# Patient Record
Sex: FEMALE | Race: BLACK OR AFRICAN AMERICAN | Employment: FULL TIME | URBAN - METROPOLITAN AREA
[De-identification: names, ages, dates, MRNs, and addresses within clinical notes are randomized per-mention and may not be internally consistent; named-entity substitution may affect disease eponyms.]

---

## 2018-12-27 ENCOUNTER — HOSPITAL ENCOUNTER (EMERGENCY)
Age: 28
Discharge: HOME | End: 2018-12-27
Attending: EMERGENCY MEDICINE
Payer: MEDICAID

## 2018-12-27 VITALS
HEART RATE: 68 BPM | BODY MASS INDEX: 25.07 KG/M2 | SYSTOLIC BLOOD PRESSURE: 121 MMHG | HEIGHT: 66 IN | DIASTOLIC BLOOD PRESSURE: 63 MMHG | TEMPERATURE: 97.4 F | WEIGHT: 156 LBS | OXYGEN SATURATION: 100 % | RESPIRATION RATE: 20 BRPM

## 2018-12-27 DIAGNOSIS — R10.2 ACUTE PELVIC PAIN, FEMALE: ICD-10-CM

## 2018-12-27 DIAGNOSIS — M54.6 ACUTE BILATERAL THORACIC BACK PAIN: Primary | ICD-10-CM

## 2018-12-27 LAB
APPEARANCE UR: CLEAR
BACTERIA URNS QL MICRO: ABNORMAL /HPF
BILIRUB UR QL: NEGATIVE
COLOR UR: YELLOW
EPITH CASTS URNS QL MICRO: ABNORMAL /LPF (ref 0–5)
GLUCOSE UR STRIP.AUTO-MCNC: NEGATIVE MG/DL
HCG UR QL: NEGATIVE
HGB UR QL STRIP: ABNORMAL
KETONES UR QL STRIP.AUTO: NEGATIVE MG/DL
LEUKOCYTE ESTERASE UR QL STRIP.AUTO: NEGATIVE
NITRITE UR QL STRIP.AUTO: NEGATIVE
PH UR STRIP: 5 [PH] (ref 5–8)
PROT UR STRIP-MCNC: NEGATIVE MG/DL
RBC #/AREA URNS HPF: ABNORMAL /HPF (ref 0–5)
SERVICE CMNT-IMP: NORMAL
SP GR UR REFRACTOMETRY: 1.02 (ref 1–1.03)
UROBILINOGEN UR QL STRIP.AUTO: 0.2 EU/DL (ref 0.2–1)
WBC URNS QL MICRO: ABNORMAL /HPF (ref 0–5)
WET PREP GENITAL: NORMAL

## 2018-12-27 PROCEDURE — 87491 CHLMYD TRACH DNA AMP PROBE: CPT

## 2018-12-27 PROCEDURE — 81025 URINE PREGNANCY TEST: CPT

## 2018-12-27 PROCEDURE — 87210 SMEAR WET MOUNT SALINE/INK: CPT

## 2018-12-27 PROCEDURE — 87086 URINE CULTURE/COLONY COUNT: CPT

## 2018-12-27 PROCEDURE — 99283 EMERGENCY DEPT VISIT LOW MDM: CPT

## 2018-12-27 PROCEDURE — 81001 URINALYSIS AUTO W/SCOPE: CPT

## 2018-12-27 RX ORDER — CHLORZOXAZONE 500 MG/1
500 TABLET ORAL
Qty: 21 TAB | Refills: 0 | Status: SHIPPED | OUTPATIENT
Start: 2018-12-27 | End: 2019-01-12

## 2018-12-27 RX ORDER — IBUPROFEN 600 MG/1
600 TABLET ORAL
Qty: 20 TAB | Refills: 0 | Status: SHIPPED | OUTPATIENT
Start: 2018-12-27 | End: 2019-01-12

## 2018-12-27 NOTE — ED TRIAGE NOTES
Patient with complaints of mid back pain and states that she is having urinary frequency and states she had an  in October and hasn't had a period since.

## 2018-12-27 NOTE — DISCHARGE INSTRUCTIONS
Pelvic Pain: Care Instructions  Your Care Instructions    Pelvic pain, or pain in the lower belly, can have many causes. Often pelvic pain is not serious and gets better in a few days. If your pain continues or gets worse, you may need tests and treatment. Tell your doctor about any new symptoms. These may be signs of a serious problem. Follow-up care is a key part of your treatment and safety. Be sure to make and go to all appointments, and call your doctor if you are having problems. It's also a good idea to know your test results and keep a list of the medicines you take. How can you care for yourself at home? · Rest until you feel better. Lie down, and raise your legs by placing a pillow under your knees. · Drink plenty of fluids. You may find that small, frequent sips are easier on your stomach than if you drink a lot at once. Avoid drinks with carbonation or caffeine, such as soda pop, tea, or coffee. · Try eating several small meals instead of 2 or 3 large ones. Eat mild foods, such as rice, dry toast or crackers, bananas, and applesauce. Avoid fatty and spicy foods, other fruits, and alcohol until 48 hours after your symptoms have gone away. · Take an over-the-counter pain medicine, such as acetaminophen (Tylenol), ibuprofen (Advil, Motrin), or naproxen (Aleve). Read and follow all instructions on the label. · Do not take two or more pain medicines at the same time unless the doctor told you to. Many pain medicines have acetaminophen, which is Tylenol. Too much acetaminophen (Tylenol) can be harmful. · You can put a heating pad, a warm cloth, or moist heat on your belly to relieve pain. When should you call for help?   Call your doctor now or seek immediate medical care if:    · You have a new or higher fever.     · You have unusual vaginal bleeding.     · You have new or worse belly or pelvic pain.     · You have vaginal discharge that has increased in amount or smells bad.    Watch closely for changes in your health, and be sure to contact your doctor if:    · You do not get better as expected. Where can you learn more? Go to http://artemio-mely.info/. Enter [phone] in the search box to learn more about \"Pelvic Pain: Care Instructions. \"  Current as of: May 15, 2018  Content Version: 11.8  © 6669-0799 [employer]. Care instructions adapted under license by [employer] (which disclaims liability or warranty for this information). If you have questions about a medical condition or this instruction, always ask your healthcare professional. [place] [address] any warranty or liability for your use of this information. Back Pain: Care Instructions  Your Care Instructions    Back pain has many possible causes. It is often related to problems with muscles and ligaments of the back. It may also be related to problems with the nerves, discs, or bones of the back. Moving, lifting, standing, sitting, or sleeping in an awkward way can strain the back. Sometimes you don't notice the injury until later. Arthritis is another common cause of back pain. Although it may hurt a lot, back pain usually improves on its own within several weeks. Most people recover in 12 weeks or less. Using good home treatment and being careful not to stress your back can help you feel better sooner. Follow-up care is a key part of your treatment and safety. Be sure to make and go to all appointments, and call your doctor if you are having problems. It's also a good idea to know your test results and keep a list of the medicines you take. How can you care for yourself at home? · Sit or lie in positions that are most comfortable and reduce your pain. Try one of these positions when you lie down:  ? Lie on your back with your knees bent and supported by large pillows. ? Lie on the floor with your legs on the seat of a sofa or chair.   ? Lie on your side with your knees and hips bent and a pillow between your legs. ? Lie on your stomach if it does not make pain worse. · Do not sit up in bed, and avoid soft couches and twisted positions. Bed rest can help relieve pain at first, but it delays healing. Avoid bed rest after the first day of back pain. · Change positions every 30 minutes. If you must sit for long periods of time, take breaks from sitting. Get up and walk around, or lie in a comfortable position. · Try using a heating pad on a low or medium setting for 15 to 20 minutes every 2 or 3 hours. Try a warm shower in place of one session with the heating pad. · You can also try an ice pack for 10 to 15 minutes every 2 to 3 hours. Put a thin cloth between the ice pack and your skin. · Take pain medicines exactly as directed. ? If the doctor gave you a prescription medicine for pain, take it as prescribed. ? If you are not taking a prescription pain medicine, ask your doctor if you can take an over-the-counter medicine. · Take short walks several times a day. You can start with 5 to 10 minutes, 3 or 4 times a day, and work up to longer walks. Walk on level surfaces and avoid hills and stairs until your back is better. · Return to work and other activities as soon as you can. Continued rest without activity is usually not good for your back. · To prevent future back pain, do exercises to stretch and strengthen your back and stomach. Learn how to use good posture, safe lifting techniques, and proper body mechanics. When should you call for help? Call your doctor now or seek immediate medical care if:    · You have new or worsening numbness in your legs.     · You have new or worsening weakness in your legs. (This could make it hard to stand up.)     · You lose control of your bladder or bowels.    Watch closely for changes in your health, and be sure to contact your doctor if:    · You have a fever, lose weight, or don't feel well.     · You do not get better as expected. Where can you learn more? Go to http://artemio-mely.info/. Enter R513 in the search box to learn more about \"Back Pain: Care Instructions. \"  Current as of: November 29, 2017  Content Version: 11.8  © 8079-6279 [employer]. Care instructions adapted under license by [employer] (which disclaims liability or warranty for this information). If you have questions about a medical condition or this instruction, always ask your healthcare professional. [place] [address] any warranty or liability for your use of this information.

## 2018-12-27 NOTE — LETTER
[facility] [place] 
[facility] EMERGENCY DEPT 
[place] [id number] 
[phone] Work/School Note Date: 12/27/2018 To Whom It May concern: 
 
Ro Chin was seen and treated today in the emergency room by the following provider(s): 
Attending Provider: Viry Farah MD 
Physician Assistant: Mandy Altamirano PA-C. Ro Chin may return to work on 12/29/2018. Sincerely, Caterina Mathew PA-C

## 2018-12-27 NOTE — ED PROVIDER NOTES
EMERGENCY DEPARTMENT HISTORY AND PHYSICAL EXAM    Date: 2018  Patient Name: Basil Messer    History of Presenting Illness     Chief Complaint   Patient presents with    Back Pain         History Provided By: Patient    Chief Complaint: Back Pain  Duration: Few Weeks  Timing:  Acute  Location: Lower  Quality: Aching  Severity: 7 out of 10  Modifying Factors: No modifying factors  Associated Symptoms: abdominal pain, urinary frequency    Additional History (Context):   9:12 AM  Basil Messer is a 33 y.o. female with PMHX of  who presents to the emergency department C/O lower back pain onset few weeks ago. Associated sxs include abdominal pain, urinary frequency. L flank >> R flank. Pt endorses intermittent generalized pelvic pain. Pt states that the pain was worse this morning. Pt states that movement makes the pain worse. Pt states that she had an elective  two months ago. Pt states that there is a chance of pregnancy. Pt denies vaginal discharge, vaginal bleeding, dysuria, fever, chills, hx of UTI, chance of STDs, and any other sxs or complaints. PCP: None        Past History     Past Medical History:  History reviewed. No pertinent past medical history.     Past Surgical History:  Past Surgical History:   Procedure Laterality Date    HX LEFT SALPINGO-OOPHORECTOMY         Family History:  Family History   Problem Relation Age of Onset    Heart Disease Maternal Grandmother         had a triple bypass 2013    Alcohol abuse Maternal Grandfather     Bleeding Prob Other         Breast Cancer       Social History:  Social History     Tobacco Use    Smoking status: Current Every Day Smoker     Types: Cigars    Smokeless tobacco: Never Used    Tobacco comment: pt smokes 3/day (black and mild cigars)   Substance Use Topics    Alcohol use: No     Comment: socially    Drug use: Yes     Frequency: 2.0 times per week     Types: Marijuana       Allergies:  No Known Allergies      Review of Systems   Review of Systems   Constitutional: Negative for chills and fever. Respiratory: Negative for cough and shortness of breath. Cardiovascular: Negative for chest pain. Gastrointestinal: Positive for abdominal pain. Negative for nausea and vomiting. Genitourinary: Positive for frequency. Negative for dysuria, flank pain, hematuria, urgency, vaginal bleeding and vaginal discharge. Musculoskeletal: Positive for back pain (lower). All other systems reviewed and are negative. Physical Exam     Vitals:    12/27/18 0858   BP: 121/63   Pulse: 68   Resp: 20   Temp: 97.4 °F (36.3 °C)   SpO2: 100%   Weight: 70.8 kg (156 lb)   Height: 5' 6\" (1.676 m)     Physical Exam   Constitutional: She is oriented to person, place, and time. She appears well-developed and well-nourished. No distress. AA female in NAD. Alert. Uncomfortable when sitting up   HENT:   Head: Normocephalic and atraumatic. Right Ear: External ear normal.   Left Ear: External ear normal.   Eyes: Conjunctivae are normal.   Neck: Normal range of motion. Cardiovascular: Normal rate, regular rhythm, normal heart sounds and intact distal pulses. Exam reveals no gallop and no friction rub. No murmur heard. Pulmonary/Chest: Effort normal and breath sounds normal. No accessory muscle usage. No tachypnea. No respiratory distress. She has no decreased breath sounds. She has no wheezes. She has no rhonchi. She has no rales. Abdominal: Soft. Normal appearance. She exhibits no distension, no ascites and no mass. There is no rigidity, no rebound, no guarding, no CVA tenderness and no tenderness at McBurney's point. Genitourinary:   Genitourinary Comments: Female chaperone in room. See pelvic procedure note. Verbal consent obtained prior to procedure. Musculoskeletal: Normal range of motion. Neurological: She is alert and oriented to person, place, and time. Skin: Skin is warm and dry. No rash noted. She is not diaphoretic.  No erythema. Psychiatric: She has a normal mood and affect. Judgment normal.   Nursing note and vitals reviewed. Diagnostic Study Results     Labs -     No results found for this or any previous visit (from the past 12 hour(s)). Radiologic Studies -   No orders to display     CT Results  (Last 48 hours)    None        CXR Results  (Last 48 hours)    None          Medications given in the ED-  Medications - No data to display      Medical Decision Making   I am the first provider for this patient. I reviewed the vital signs, available nursing notes, past medical history, past surgical history, family history and social history. Vital Signs-Reviewed the patient's vital signs. Pulse Oximetry Analysis - 100% on RA     Records Reviewed: Nursing Notes and Old Medical Records    Provider Notes (Medical Decision Making): UTI/pyelo, stone, renal infarct, MSK, pregnancy (ectopic), STI/PID. Doubt appy or dissection. Procedures:  Pelvic Exam  Date/Time: 12/27/2018 9:18 AM  Performed by: PA  Procedure duration:  15 minutes. Documented by: Erica Tom PA-C. Exam assisted by:  Female chaperone in room. Type of exam performed: bimanual and speculum. External genitalia appearance: normal.    Vaginal exam:  normal.    Cervical exam:  normal.    Specimen(s) collected:  chlamydia, GC and vaginal culture. Bimanual exam:  normal.    Patient tolerance: Patient tolerated the procedure well with no immediate complications            ED Course:   9:12 AM Initial assessment performed. The patients presenting problems have been discussed, and they are in agreement with the care plan formulated and outlined with them. I have encouraged them to ask questions as they arise throughout their visit. Diagnosis and Disposition     Bilateral flank pain x 2 months. Benign abdomen. No pain at McBurney's. Neg Boggs's. Labs reassuring. Pelvic unremarkable. Suspect MSK as pain worse with movement.  No indication for emergent advanced imaging. Reasons to RTED discussed with pt. All questions answered. Pt feels comfortable going home at this time. Pt expressed understanding and she agrees with plan. DISCHARGE NOTE:  10:42 AM  Kaley Russ's  results have been reviewed with her. She has been counseled regarding her diagnosis, treatment, and plan. She verbally conveys understanding and agreement of the signs, symptoms, diagnosis, treatment and prognosis and additionally agrees to follow up as discussed. She also agrees with the care-plan and conveys that all of her questions have been answered. I have also provided discharge instructions for her that include: educational information regarding their diagnosis and treatment, and list of reasons why they would want to return to the ED prior to their follow-up appointment, should her condition change. She has been provided with education for proper emergency department utilization. CLINICAL IMPRESSION:    1. Acute bilateral thoracic back pain    2. Acute pelvic pain, female        PLAN:  1. D/C Home  2. Discharge Medication List as of 12/27/2018 10:42 AM      START taking these medications    Details   chlorzoxazone (PARAFON FORTE DSC) 500 mg tablet Take 1 Tab by mouth three (3) times daily as needed for Muscle Spasm(s). , Print, Disp-21 Tab, R-0      ibuprofen (MOTRIN) 600 mg tablet Take 1 Tab by mouth every six (6) hours as needed for Pain. Take with food. , Print, Disp-20 Tab, R-0           3.    Follow-up Information     Follow up With Specialties Details Why Contact Info    Lexi Merino, DO Orthopedic Surgery Schedule an appointment as soon as possible for a visit For follow up with orthopedic surgeon [address] [place] [address] and Aidan SANCHEZ[address]      [facility] EMERGENCY DEPT Emergency Medicine Go to As needed, if symptoms worsen [address] [id number] [phone] _______________________________    Attestations: This note is prepared by RACHEL ORTA, acting as Scribe for MonkimunFREDERICK. Monkimun, FREDERICK:  The scribe's documentation has been prepared under my direction and personally reviewed by me in its entirety.   I confirm that the note above accurately reflects all work, treatment, procedures, and medical decision making performed by me.  _______________________________

## 2018-12-28 LAB
BACTERIA SPEC CULT: NORMAL
C TRACH RRNA SPEC QL NAA+PROBE: NEGATIVE
N GONORRHOEA RRNA SPEC QL NAA+PROBE: NEGATIVE
SERVICE CMNT-IMP: NORMAL
SPECIMEN SOURCE: NORMAL

## 2019-01-12 ENCOUNTER — HOSPITAL ENCOUNTER (EMERGENCY)
Age: 29
Discharge: HOME | End: 2019-01-12
Attending: EMERGENCY MEDICINE
Payer: MEDICAID

## 2019-01-12 VITALS
BODY MASS INDEX: 25.07 KG/M2 | OXYGEN SATURATION: 100 % | RESPIRATION RATE: 16 BRPM | TEMPERATURE: 97 F | SYSTOLIC BLOOD PRESSURE: 121 MMHG | DIASTOLIC BLOOD PRESSURE: 72 MMHG | WEIGHT: 156 LBS | HEART RATE: 78 BPM | HEIGHT: 66 IN

## 2019-01-12 DIAGNOSIS — Z71.6 TOBACCO ABUSE COUNSELING: ICD-10-CM

## 2019-01-12 DIAGNOSIS — Z72.0 TOBACCO USE: ICD-10-CM

## 2019-01-12 DIAGNOSIS — K29.00 ACUTE GASTRITIS WITHOUT HEMORRHAGE, UNSPECIFIED GASTRITIS TYPE: Primary | ICD-10-CM

## 2019-01-12 DIAGNOSIS — G89.29 CHRONIC LOW BACK PAIN WITHOUT SCIATICA, UNSPECIFIED BACK PAIN LATERALITY: ICD-10-CM

## 2019-01-12 DIAGNOSIS — M54.50 CHRONIC LOW BACK PAIN WITHOUT SCIATICA, UNSPECIFIED BACK PAIN LATERALITY: ICD-10-CM

## 2019-01-12 LAB
ALBUMIN SERPL-MCNC: 4 G/DL (ref 3.4–5)
ALBUMIN/GLOB SERPL: 1.3 {RATIO} (ref 0.8–1.7)
ALP SERPL-CCNC: 47 U/L (ref 45–117)
ALT SERPL-CCNC: 38 U/L (ref 13–56)
ANION GAP SERPL CALC-SCNC: 8 MMOL/L (ref 3–18)
APPEARANCE UR: CLEAR
AST SERPL-CCNC: 33 U/L (ref 15–37)
BACTERIA URNS QL MICRO: ABNORMAL /HPF
BASOPHILS # BLD: 0.1 K/UL (ref 0–0.1)
BASOPHILS NFR BLD: 1 % (ref 0–2)
BILIRUB SERPL-MCNC: 0.5 MG/DL (ref 0.2–1)
BILIRUB UR QL: NEGATIVE
BUN SERPL-MCNC: 11 MG/DL (ref 7–18)
BUN/CREAT SERPL: 13 (ref 12–20)
CALCIUM SERPL-MCNC: 9 MG/DL (ref 8.5–10.1)
CHLORIDE SERPL-SCNC: 111 MMOL/L (ref 100–108)
CO2 SERPL-SCNC: 25 MMOL/L (ref 21–32)
COLOR UR: YELLOW
CREAT SERPL-MCNC: 0.84 MG/DL (ref 0.6–1.3)
DIFFERENTIAL METHOD BLD: NORMAL
EOSINOPHIL # BLD: 0.1 K/UL (ref 0–0.4)
EOSINOPHIL NFR BLD: 2 % (ref 0–5)
EPITH CASTS URNS QL MICRO: ABNORMAL /LPF (ref 0–5)
ERYTHROCYTE [DISTWIDTH] IN BLOOD BY AUTOMATED COUNT: 12.3 % (ref 11.6–14.5)
GLOBULIN SER CALC-MCNC: 3.1 G/DL (ref 2–4)
GLUCOSE SERPL-MCNC: 78 MG/DL (ref 74–99)
GLUCOSE UR STRIP.AUTO-MCNC: NEGATIVE MG/DL
HCG UR QL: NEGATIVE
HCT VFR BLD AUTO: 43.4 % (ref 35–45)
HGB BLD-MCNC: 14.4 G/DL (ref 12–16)
HGB UR QL STRIP: ABNORMAL
KETONES UR QL STRIP.AUTO: 15 MG/DL
LEUKOCYTE ESTERASE UR QL STRIP.AUTO: NEGATIVE
LIPASE SERPL-CCNC: 191 U/L (ref 73–393)
LYMPHOCYTES # BLD: 2.3 K/UL (ref 0.9–3.6)
LYMPHOCYTES NFR BLD: 39 % (ref 21–52)
MCH RBC QN AUTO: 29.1 PG (ref 24–34)
MCHC RBC AUTO-ENTMCNC: 33.2 G/DL (ref 31–37)
MCV RBC AUTO: 87.9 FL (ref 74–97)
MONOCYTES # BLD: 0.5 K/UL (ref 0.05–1.2)
MONOCYTES NFR BLD: 8 % (ref 3–10)
NEUTS SEG # BLD: 3 K/UL (ref 1.8–8)
NEUTS SEG NFR BLD: 50 % (ref 40–73)
NITRITE UR QL STRIP.AUTO: NEGATIVE
PH UR STRIP: 8 [PH] (ref 5–8)
PLATELET # BLD AUTO: 229 K/UL (ref 135–420)
PMV BLD AUTO: 10.1 FL (ref 9.2–11.8)
POTASSIUM SERPL-SCNC: 3.9 MMOL/L (ref 3.5–5.5)
PROT SERPL-MCNC: 7.1 G/DL (ref 6.4–8.2)
PROT UR STRIP-MCNC: NEGATIVE MG/DL
RBC # BLD AUTO: 4.94 M/UL (ref 4.2–5.3)
RBC #/AREA URNS HPF: ABNORMAL /HPF (ref 0–5)
SODIUM SERPL-SCNC: 144 MMOL/L (ref 136–145)
SP GR UR REFRACTOMETRY: 1.02 (ref 1–1.03)
UROBILINOGEN UR QL STRIP.AUTO: 0.2 EU/DL (ref 0.2–1)
WBC # BLD AUTO: 6 K/UL (ref 4.6–13.2)
WBC URNS QL MICRO: ABNORMAL /HPF (ref 0–5)

## 2019-01-12 PROCEDURE — 96361 HYDRATE IV INFUSION ADD-ON: CPT

## 2019-01-12 PROCEDURE — 80053 COMPREHEN METABOLIC PANEL: CPT

## 2019-01-12 PROCEDURE — 74011000250 HC RX REV CODE- 250: Performed by: EMERGENCY MEDICINE

## 2019-01-12 PROCEDURE — 85025 COMPLETE CBC W/AUTO DIFF WBC: CPT

## 2019-01-12 PROCEDURE — 83690 ASSAY OF LIPASE: CPT

## 2019-01-12 PROCEDURE — 74011250636 HC RX REV CODE- 250/636: Performed by: EMERGENCY MEDICINE

## 2019-01-12 PROCEDURE — 74011250637 HC RX REV CODE- 250/637: Performed by: EMERGENCY MEDICINE

## 2019-01-12 PROCEDURE — 81025 URINE PREGNANCY TEST: CPT

## 2019-01-12 PROCEDURE — 81001 URINALYSIS AUTO W/SCOPE: CPT

## 2019-01-12 PROCEDURE — 96374 THER/PROPH/DIAG INJ IV PUSH: CPT

## 2019-01-12 PROCEDURE — 99284 EMERGENCY DEPT VISIT MOD MDM: CPT

## 2019-01-12 RX ORDER — RANITIDINE 150 MG/1
300 TABLET, FILM COATED ORAL 2 TIMES DAILY
Qty: 60 TAB | Refills: 0 | Status: SHIPPED | OUTPATIENT
Start: 2019-01-12 | End: 2019-01-15

## 2019-01-12 RX ORDER — CHLORZOXAZONE 500 MG/1
500 TABLET ORAL
Qty: 24 TAB | Refills: 0 | Status: SHIPPED | OUTPATIENT
Start: 2019-01-12

## 2019-01-12 RX ORDER — ONDANSETRON 4 MG/1
TABLET, ORALLY DISINTEGRATING ORAL
Qty: 10 TAB | Refills: 0 | Status: SHIPPED | OUTPATIENT
Start: 2019-01-12 | End: 2019-04-16

## 2019-01-12 RX ORDER — DICYCLOMINE HYDROCHLORIDE 10 MG/1
20 CAPSULE ORAL
Status: COMPLETED | OUTPATIENT
Start: 2019-01-12 | End: 2019-01-12

## 2019-01-12 RX ORDER — ONDANSETRON 4 MG/1
4 TABLET, ORALLY DISINTEGRATING ORAL
Status: COMPLETED | OUTPATIENT
Start: 2019-01-12 | End: 2019-01-12

## 2019-01-12 RX ORDER — CALCIUM CARBONATE 750 MG/1
1 TABLET, CHEWABLE ORAL
Qty: 30 TAB | Refills: 0 | Status: SHIPPED | OUTPATIENT
Start: 2019-01-12

## 2019-01-12 RX ORDER — FAMOTIDINE 10 MG/ML
20 INJECTION INTRAVENOUS
Status: COMPLETED | OUTPATIENT
Start: 2019-01-12 | End: 2019-01-12

## 2019-01-12 RX ORDER — SODIUM CHLORIDE 9 MG/ML
1000 INJECTION, SOLUTION INTRAVENOUS CONTINUOUS
Status: DISCONTINUED | OUTPATIENT
Start: 2019-01-12 | End: 2019-01-12 | Stop reason: HOSPADM

## 2019-01-12 RX ADMIN — SODIUM CHLORIDE 1000 ML: 900 INJECTION, SOLUTION INTRAVENOUS at 06:53

## 2019-01-12 RX ADMIN — DICYCLOMINE HYDROCHLORIDE 20 MG: 10 CAPSULE ORAL at 06:53

## 2019-01-12 RX ADMIN — SODIUM CHLORIDE 1000 ML: 900 INJECTION, SOLUTION INTRAVENOUS at 06:41

## 2019-01-12 RX ADMIN — LIDOCAINE HYDROCHLORIDE 50 ML: 20 SOLUTION ORAL; TOPICAL at 06:57

## 2019-01-12 RX ADMIN — ONDANSETRON 4 MG: 4 TABLET, ORALLY DISINTEGRATING ORAL at 06:23

## 2019-01-12 RX ADMIN — FAMOTIDINE 20 MG: 10 INJECTION, SOLUTION INTRAVENOUS at 06:52

## 2019-01-12 NOTE — ED NOTES
I have reviewed discharge instructions with the patient. The patient verbalized understanding. Arm band removed and placed in shred it.  Patient reports her  will be driving her her home,

## 2019-01-12 NOTE — ED NOTES
Patient states she does smoke a lot of marijuana and she notices that she has been having more stomach issues and personality issues since does use it. States she is going to stop using it.

## 2019-01-12 NOTE — ED NOTES
Pt hourly rounding competed. Safety Pt (**) resting on stretcher with side rails up and call bell in reach. () in chair 
  () in parents arms. Toileting Pt offered ()Bedpan 
   (**)Assistance to Restroom 
   ()Urinal 
Ongoing UpdatesUpdated on plan of care and status of test results. Pain Management Inquired as to comfort and offered comfort measures: 
  () warm blankets (**) dimmed lights

## 2019-01-12 NOTE — ED PROVIDER NOTES
EMERGENCY DEPARTMENT HISTORY AND PHYSICAL EXAM 
 
Date: 2019 Patient Name: Andrew Graham History of Presenting Illness Chief Complaint Patient presents with  Vomiting History Provided By: Patient Chief Complaint: N/V and epigastric abd pain Duration: ~8 Hours Timing:  Constant Location: epigastric area Quality: Burning Associated Symptoms: chills, bilateral ear pain, and sore throat Additional History (Context):  
 
6:30 AM 
 
Andrew Graham is a A4 (2 ectopic pregnancies) 33 y.o. female with PMHx of gastroenteritis (2 yrs ago) presenting ambulatory to the ED c/o N/V and burning epigastric abd pain x last night (~10pm). Pt notes associated symptoms of chills, bilateral ear pain, and sore throat. Pt states that she had a couple of ETOH drinks at home last night (1 shot of liquor, 1 mixed drink, and one beer). Pt also notes regular NSAID use. Pt states that she also had seafood last night. Pt denies any history of HTN or DM. Pt denies any allergies to medications. Pt specifically denies any diarrhea, hematemesis, and generalized body aches. PCP: None Pt admits to smoking tobacco and ETOH use. There are no other complaints, changes, or physical findings at this time. Current Facility-Administered Medications Medication Dose Route Frequency Provider Last Rate Last Dose  
 0.9% sodium chloride infusion 1,000 mL  1,000 mL IntraVENous CONTINUOUS Celsa Huff MD   Stopped at 19 5536  sodium chloride 0.9 % bolus infusion 1,000 mL  1,000 mL IntraVENous ONCE Celsa Huff MD 1,000 mL/hr at 19 0653 1,000 mL at 19 1871 Current Outpatient Medications Medication Sig Dispense Refill  raNITIdine (ZANTAC) 150 mg tablet Take 2 Tabs by mouth two (2) times a day for 3 days.  Then 1-2 tabs as needed 60 Tab 0  
 calcium carbonate (TUMS E-X) 300 mg (750 mg) chewable tablet Take 1 Tab by mouth three (3) times daily as needed for Indigestion. 30 Tab 0  chlorzoxazone (PARAFON FORTE DSC) 500 mg tablet Take 1 Tab by mouth four (4) times daily as needed for Muscle Spasm(s). 24 Tab 0 Past History Past Medical History: 
History reviewed. No pertinent past medical history. Past Surgical History: 
Past Surgical History:  
Procedure Laterality Date  HX LEFT SALPINGO-OOPHORECTOMY  2014 Family History: 
Family History Problem Relation Age of Onset  Heart Disease Maternal Grandmother   
     had a triple bypass 6/2013  Alcohol abuse Maternal Grandfather  Bleeding Prob Other Breast Cancer Social History: 
Social History Tobacco Use  Smoking status: Current Every Day Smoker Packs/day: 0.25 Types: Cigars  Smokeless tobacco: Never Used  Tobacco comment: pt smokes 3/day (black and mild cigars) Substance Use Topics  Alcohol use: Yes Comment: socially  Drug use: Yes Frequency: 2.0 times per week Types: Marijuana Allergies: 
No Known Allergies Review of Systems Review of Systems Constitutional: Positive for chills. HENT: Positive for ear pain (bilateral) and sore throat. Gastrointestinal: Positive for abdominal pain (epigastric), nausea and vomiting. Negative for diarrhea. Negative for hematemesis Musculoskeletal: Negative for myalgias. All other systems reviewed and are negative. Physical Exam  
 
Vitals:  
 01/12/19 7967 01/12/19 6059 BP: 112/75 121/68 Pulse: 83 68 Resp: 16 16 Temp: 97.1 °F (36.2 °C) 97 °F (36.1 °C) SpO2: 100% 100% Weight: 70.8 kg (156 lb) Height: 5' 6\" (1.676 m) Physical Exam  
Constitutional: She is oriented to person, place, and time. She appears well-developed and well-nourished. No distress. Well appearing, tremulous Nontoxic HENT:  
Head: Normocephalic and atraumatic.   
Right Ear: External ear normal.  
Left Ear: External ear normal.  
 Mouth/Throat: Oropharynx is clear and moist. No oropharyngeal exudate. Eyes: Conjunctivae and EOM are normal. Pupils are equal, round, and reactive to light. No scleral icterus. No pallor Neck: Normal range of motion. Neck supple. No JVD present. No tracheal deviation present. No thyromegaly present. Cardiovascular: Regular rhythm and normal heart sounds. Borderline tachycardia Pulmonary/Chest: Effort normal and breath sounds normal. No stridor. No respiratory distress. Abdominal: Soft. Bowel sounds are normal. She exhibits no distension. There is tenderness. There is no rebound and no guarding. Tender epigastrium, no RUQ tenderness, no rebound, no Boggs's signs, no peroneal signs Musculoskeletal: Normal range of motion. She exhibits no edema or tenderness. No soft tissue injuries Lymphadenopathy:  
  She has no cervical adenopathy. Neurological: She is alert and oriented to person, place, and time. She has normal reflexes. No cranial nerve deficit. Coordination normal.  
Skin: Skin is warm and dry. No rash noted. She is not diaphoretic. No erythema. Psychiatric: She has a normal mood and affect. Her behavior is normal. Judgment and thought content normal.  
Nursing note and vitals reviewed. Diagnostic Study Results Labs - Recent Results (from the past 12 hour(s)) URINALYSIS W/ RFLX MICROSCOPIC Collection Time: 01/12/19  6:30 AM  
Result Value Ref Range Color YELLOW Appearance CLEAR Specific gravity 1.020 1.005 - 1.030    
 pH (UA) 8.0 5.0 - 8.0 Protein NEGATIVE  NEG mg/dL Glucose NEGATIVE  NEG mg/dL Ketone 15 (A) NEG mg/dL Bilirubin NEGATIVE  NEG Blood TRACE (A) NEG Urobilinogen 0.2 0.2 - 1.0 EU/dL Nitrites NEGATIVE  NEG Leukocyte Esterase NEGATIVE  NEG    
HCG URINE, QL Collection Time: 01/12/19  6:30 AM  
Result Value Ref Range HCG urine, QL NEGATIVE  NEG    
URINE MICROSCOPIC ONLY Collection Time: 01/12/19  6:30 AM  
Result Value Ref Range WBC 0 to 3 0 - 5 /hpf  
 RBC 0 to 3 0 - 5 /hpf Epithelial cells FEW 0 - 5 /lpf Bacteria FEW (A) NEG /hpf  
CBC WITH AUTOMATED DIFF Collection Time: 01/12/19  6:45 AM  
Result Value Ref Range WBC 6.0 4.6 - 13.2 K/uL  
 RBC 4.94 4.20 - 5.30 M/uL  
 HGB 14.4 12.0 - 16.0 g/dL HCT 43.4 35.0 - 45.0 % MCV 87.9 74.0 - 97.0 FL  
 MCH 29.1 24.0 - 34.0 PG  
 MCHC 33.2 31.0 - 37.0 g/dL  
 RDW 12.3 11.6 - 14.5 % PLATELET 535 037 - 118 K/uL MPV 10.1 9.2 - 11.8 FL  
 NEUTROPHILS 50 40 - 73 % LYMPHOCYTES 39 21 - 52 % MONOCYTES 8 3 - 10 % EOSINOPHILS 2 0 - 5 % BASOPHILS 1 0 - 2 %  
 ABS. NEUTROPHILS 3.0 1.8 - 8.0 K/UL  
 ABS. LYMPHOCYTES 2.3 0.9 - 3.6 K/UL  
 ABS. MONOCYTES 0.5 0.05 - 1.2 K/UL  
 ABS. EOSINOPHILS 0.1 0.0 - 0.4 K/UL  
 ABS. BASOPHILS 0.1 0.0 - 0.1 K/UL  
 DF AUTOMATED METABOLIC PANEL, COMPREHENSIVE Collection Time: 01/12/19  6:45 AM  
Result Value Ref Range Sodium 144 136 - 145 mmol/L Potassium 3.9 3.5 - 5.5 mmol/L Chloride 111 (H) 100 - 108 mmol/L  
 CO2 25 21 - 32 mmol/L Anion gap 8 3.0 - 18 mmol/L Glucose 78 74 - 99 mg/dL BUN 11 7.0 - 18 MG/DL Creatinine 0.84 0.6 - 1.3 MG/DL  
 BUN/Creatinine ratio 13 12 - 20 GFR est AA >60 >60 ml/min/1.73m2 GFR est non-AA >60 >60 ml/min/1.73m2 Calcium 9.0 8.5 - 10.1 MG/DL Bilirubin, total 0.5 0.2 - 1.0 MG/DL  
 ALT (SGPT) 38 13 - 56 U/L  
 AST (SGOT) 33 15 - 37 U/L Alk. phosphatase 47 45 - 117 U/L Protein, total 7.1 6.4 - 8.2 g/dL Albumin 4.0 3.4 - 5.0 g/dL Globulin 3.1 2.0 - 4.0 g/dL A-G Ratio 1.3 0.8 - 1.7 LIPASE Collection Time: 01/12/19  6:45 AM  
Result Value Ref Range Lipase 191 73 - 393 U/L Radiologic Studies - No orders to display Medical Decision Making I am the first provider for this patient.  
 
I reviewed the vital signs, available nursing notes, past medical history, past surgical history, family history and social history. Vital Signs-Reviewed the patient's vital signs. Pulse Oximetry Analysis - 100% on RA Records Reviewed: Nursing Notes and Old Medical Records Provider Notes (Medical Decision Making): Sx of gastritis likely from EtOH use mixed with NSAIDs. Possible pancreatitis. Unlikely to be blockage or obstruction. Will treat sxs of gastric source. Check lab work, as well as pregnancy. Unlikely to need xray or US unless slow to respond to medications. No signs of aspiration or other significant illness at this time. PROCEDURES: 
Procedures MEDICATIONS GIVEN IN THE ED: 
Medications  
0.9% sodium chloride infusion 1,000 mL (0 mL IntraVENous IV Completed 1/12/19 0709)  
sodium chloride 0.9 % bolus infusion 1,000 mL (1,000 mL IntraVENous New Bag 1/12/19 0653) ondansetron (ZOFRAN ODT) tablet 4 mg (4 mg Oral Given 1/12/19 7149) dicyclomine (BENTYL) capsule 20 mg (20 mg Oral Given 1/12/19 0653)  
famotidine (PF) (PEPCID) injection 20 mg (20 mg IntraVENous Given 1/12/19 0852) mylanta/viscous lidocaine (GI COCKTAIL) (50 mL Oral Given 1/12/19 0657) ED COURSE:  
6:30 AM  
Initial assessment performed. 6:36 AM  
Tobacco Counseling Discussed the risks of smoking and the benefits of smoking cessation as well as the long term sequelae of smoking with the patient. The patient verbalized their understanding. Counseled patient for approximately 2 minutes. PROGRESS NOTE: 
7:32 AM 
Pt and/or family have been updated on their results. Pt and/or pt's family are aware of the plan of care and are in agreement. Written by Fabricio Wilks, ED Scribe, as dictated by Qi Helton MD. Diagnosis and Disposition DISCHARGE NOTE: 
7:32 AM 
The patient is ready for discharge.  The patient's signs, symptoms, diagnosis, and discharge instructions have been discussed and the patient and/or family has conveyed their understanding. The patient and/or family is to follow up as recommended or return to the ER should their symptoms worsen. Plan has been discussed and the patient and/or family is in agreement. Written by Diana Donaldson ED Scribe, as dictated by Chintan Turcios MD.  
 
CLINICAL IMPRESSION: 
1. Acute gastritis without hemorrhage, unspecified gastritis type 2. Chronic low back pain without sciatica, unspecified back pain laterality 3. Tobacco use 4. Tobacco abuse counseling PLAN: 
1. D/C Home 2. Current Discharge Medication List  
  
START taking these medications Details  
raNITIdine (ZANTAC) 150 mg tablet Take 2 Tabs by mouth two (2) times a day for 3 days. Then 1-2 tabs as needed 
Qty: 60 Tab, Refills: 0  
  
calcium carbonate (TUMS E-X) 300 mg (750 mg) chewable tablet Take 1 Tab by mouth three (3) times daily as needed for Indigestion. Qty: 30 Tab, Refills: 0 CONTINUE these medications which have CHANGED Details  
chlorzoxazone (PARAFON FORTE DSC) 500 mg tablet Take 1 Tab by mouth four (4) times daily as needed for Muscle Spasm(s). Qty: 24 Tab, Refills: 0 STOP taking these medications  
  
 ibuprofen (MOTRIN) 600 mg tablet Comments:  
Reason for Stopping: 3.  
Follow-up Information Follow up With Specialties Details Why Contact Info Chencho Farnsworth MD Internal Medicine Schedule an appointment as soon as possible for a visit in 3 days for primary care follow up [address] [place] [address] 
[phone] [facility] EMERGENCY DEPT Emergency Medicine Go to As needed, If symptoms worsen [address] [place] [address] 
[phone]  
  
 
_______________________________ Attestations: This note is prepared by Rosa Douglas, acting as Scribe for Marline. Piotr Turcios MD. [employer].  Piotr Turcios MD:  The scribe's documentation has been prepared under my direction and personally reviewed by me in its entirety. I confirm that the note above accurately reflects all work, treatment, procedures, and medical decision making performed by me. 
_______________________________

## 2019-01-12 NOTE — ED TRIAGE NOTES
Pt to ED via personal vehicle for vomiting that started late last night. Pt endorses alcohol use last night, several mixed drinks. Pt A&O x 4, able to speak in full, complete sentences to make needs known.

## 2019-01-12 NOTE — DISCHARGE INSTRUCTIONS
Patient Education        Learning About Low Back Pain  What is low back pain? Low back pain is pain that can occur anywhere below the ribs and above the legs. It is very common. Almost everyone has it at one time or another. Low back pain can be:  Acute. This is new pain that can last a few days to a few weeks--at the most a few months. Chronic. This pain can last for more than a few months. Sometimes it can last for years. What are some myths about low back pain? Here are some common myths about low back pain--and the facts:  Myth: \"I need to rest my back when I have back pain. \"  Fact: Staying active won't hurt you. It may help you get better faster. Myth: \"I need prescription pain medicine. \"  Fact: It's best to try to let time and being active heal your back. Opioid pain medicines--such as hydrocodone or oxycodone--usually don't work any better than over-the-counter medicines like ibuprofen or naproxen. And opioids can cause serious problems like addiction or overdose. Myth: \"I need a test like an X-ray or an MRI to diagnose my low back pain. \"  Fact: Getting a test right away won't help you get better faster. And it could lead you down a treatment path you may not need, since most people get better on their own. What causes low back pain? In most cases, there isn't a clear cause. This can be frustrating, because your back hurts and there's no obvious reason. Your back pain can be caused by:  Overuse or muscle strain. This can happen from playing sports, lifting heavy things, or not being physically fit. A herniated disc. This is a problem with the cushion between the bones in your back. Arthritis. With age, you may have changes in your bones that can narrow the space around your nerves. Other causes. In rare cases, the cause is a serious illness like an infection or cancer. But there are usually other symptoms too. What are the symptoms?   Your symptoms depend on your body and the cause of your back pain. You may feel:  · Pain that's sharp or dull. It may be in one small area or over a broad area. But even bad pain doesn't mean that it's caused by something serious. · Leg pain, numbness, or tingling. When a nerve gets squeezed--such as from a disc problem or arthritis--you may have symptoms in your leg or foot. You can even have leg symptoms from a back problem without having any pain in your back. How is low back pain diagnosed? A physical exam is the main way to diagnose low back pain. Your doctor may examine your back, check your nerves by testing your reflexes, and make sure that your muscles are strong. He or she also will ask questions about your back and overall health. Most people don't need any tests right away. Tests often don't show the reason for your pain. If your pain lasts more than 6 weeks or you have symptoms that your doctor is more concerned about, he or she may order tests. These may include an X-ray, a CT scan, or an MRI. In some cases, tests can help your doctor find a cause for your pain, especially for pain in one or both legs. How is low back pain treated? Most acute low back pain gets better on its own within a few weeks, no matter what the cause. Time and doing usual activities are all that most people need to feel better. Using heat or ice and taking over-the-counter pain medicine also can help while your body heals. If you aren't getting better on your own or your pain is very bad, your doctor may recommend:  · Physical therapy. · Spinal manipulation, such as by a chiropractor. · Acupuncture. · Massage. · Injections of steroid medicine in your back (especially for pain that involves your legs). If you have chronic low back pain, treatment will help you understand and manage your pain. Treatment may include:  · Staying active. This may include walking or doing back exercises. · Physical therapy. · Medicines.  Some of these medicines are also used for other problems, like depression. · Pain management. Your doctor may have you see a pain specialist.  · Counseling. Having chronic pain can be hard. It may help to talk to someone who can help you cope with your pain. Surgery isn't needed for most people. But it may help some types of low back pain. Follow-up care is a key part of your treatment and safety. Be sure to make and go to all appointments, and call your doctor if you are having problems. It's also a good idea to know your test results and keep a list of the medicines you take. When should you call for help? Call 911 anytime you think you may need emergency care. For example, call if:  · You can't move a leg at all. Call your doctor now or seek immediate medical care if:  · You have new or worse symptoms in your legs, belly, or buttocks. Symptoms may include:  ? Numbness or tingling. ? Weakness. ? Pain. · You lose bladder or bowel control. Watch closely for changes in your health, and be sure to contact your doctor if:  · Along with the back pain, you have a fever, lose weight, or don't feel well. · You do not get better as expected. Where can you learn more? Go to http://artemio-mely.info/. Enter A007 in the search box to learn more about \"Learning About Low Back Pain. \"  Current as of: November 29, 2017  Content Version: 11.8  © 5378-9237 [employer]. Care instructions adapted under license by [employer] (which disclaims liability or warranty for this information). If you have questions about a medical condition or this instruction, always ask your healthcare professional. [place] [address] any warranty or liability for your use of this information. Patient Education        Gastritis: Care Instructions  Your Care Instructions    Gastritis is a sore and upset stomach. It happens when something irritates the stomach lining. Many things can cause it.  These include an infection such as the flu or something you ate or drank. Medicines or a sore on the lining of the stomach (ulcer) also can cause it. Your belly may bloat and ache. You may belch, vomit, and feel sick to your stomach. You should be able to relieve the problem by taking medicine. And it may help to change your diet. If gastritis lasts, your doctor may prescribe medicine. Follow-up care is a key part of your treatment and safety. Be sure to make and go to all appointments, and call your doctor if you are having problems. It's also a good idea to know your test results and keep a list of the medicines you take. How can you care for yourself at home? · If your doctor prescribed antibiotics, take them as directed. Do not stop taking them just because you feel better. You need to take the full course of antibiotics. · Be safe with medicines. If your doctor prescribed medicine to decrease stomach acid, take it as directed. Call your doctor if you think you are having a problem with your medicine. · Do not take any other medicine, including over-the-counter pain relievers, without talking to your doctor first.  · If your doctor recommends over-the-counter medicine to reduce stomach acid, such as Pepcid AC, Prilosec, Tagamet HB, or Zantac 75, follow the directions on the label. · Drink plenty of fluids (enough so that your urine is light yellow or clear like water) to prevent dehydration. Choose water and other caffeine-free clear liquids. If you have kidney, heart, or liver disease and have to limit fluids, talk with your doctor before you increase the amount of fluids you drink. · Limit how much alcohol you drink. · Avoid coffee, tea, cola drinks, chocolate, and other foods with caffeine. They increase stomach acid. When should you call for help? Call 911 anytime you think you may need emergency care. For example, call if:    · You vomit blood or what looks like coffee grounds.     · You pass maroon or very bloody stools.  Call your doctor now or seek immediate medical care if:    · You start breathing fast and have not produced urine in the last 8 hours.     · You cannot keep fluids down.    Watch closely for changes in your health, and be sure to contact your doctor if:    · You do not get better as expected. Where can you learn more? Go to http://artemio-mely.info/. Enter [id number] in the search box to learn more about \"Gastritis: Care Instructions. \"  Current as of: March 28, 2018  Content Version: 11.8  © 2720-1097 [employer]. Care instructions adapted under license by [employer] (which disclaims liability or warranty for this information). If you have questions about a medical condition or this instruction, always ask your healthcare professional. [address] any warranty or liability for your use of this information. Gastritis: Care Instructions  Your Care Instructions    Gastritis is a sore and upset stomach. It happens when something irritates the stomach lining. Many things can cause it. These include an infection such as the flu or something you ate or drank. Medicines or a sore on the lining of the stomach (ulcer) also can cause it. Your belly may bloat and ache. You may belch, vomit, and feel sick to your stomach. You should be able to relieve the problem by taking medicine. And it may help to change your diet. If gastritis lasts, your doctor may prescribe medicine. Follow-up care is a key part of your treatment and safety. Be sure to make and go to all appointments, and call your doctor if you are having problems. It's also a good idea to know your test results and keep a list of the medicines you take. How can you care for yourself at home? · If your doctor prescribed antibiotics, take them as directed. Do not stop taking them just because you feel better. You need to take the full course of antibiotics. · Be safe with medicines.  If your doctor prescribed medicine to decrease stomach acid, take it as directed. Call your doctor if you think you are having a problem with your medicine. · Do not take any other medicine, including over-the-counter pain relievers, without talking to your doctor first.  · If your doctor recommends over-the-counter medicine to reduce stomach acid, such as Pepcid AC, Prilosec, Tagamet HB, or Zantac 75, follow the directions on the label. · Drink plenty of fluids (enough so that your urine is light yellow or clear like water) to prevent dehydration. Choose water and other caffeine-free clear liquids. If you have kidney, heart, or liver disease and have to limit fluids, talk with your doctor before you increase the amount of fluids you drink. · Limit how much alcohol you drink. · Avoid coffee, tea, cola drinks, chocolate, and other foods with caffeine. They increase stomach acid. When should you call for help? Call 911 anytime you think you may need emergency care. For example, call if:    · You vomit blood or what looks like coffee grounds.     · You pass maroon or very bloody stools.    Call your doctor now or seek immediate medical care if:    · You start breathing fast and have not produced urine in the last 8 hours.     · You cannot keep fluids down.    Watch closely for changes in your health, and be sure to contact your doctor if:    · You do not get better as expected. Where can you learn more? Go to http://artemio-mely.info/. Enter [id number] in the search box to learn more about \"Gastritis: Care Instructions. \"  Current as of: March 28, 2018  Content Version: 11.8  © 6247-5278 [employer]. Care instructions adapted under license by [employer] (which disclaims liability or warranty for this information).  If you have questions about a medical condition or this instruction, always ask your healthcare professional. [employer] disclaims any warranty or liability for your use of this information.

## 2019-04-10 ENCOUNTER — HOSPITAL ENCOUNTER (EMERGENCY)
Age: 29
Discharge: HOME | End: 2019-04-10
Attending: EMERGENCY MEDICINE
Payer: MEDICAID

## 2019-04-10 VITALS
HEART RATE: 76 BPM | DIASTOLIC BLOOD PRESSURE: 68 MMHG | TEMPERATURE: 97.8 F | WEIGHT: 165 LBS | OXYGEN SATURATION: 100 % | RESPIRATION RATE: 16 BRPM | HEIGHT: 66 IN | BODY MASS INDEX: 26.52 KG/M2 | SYSTOLIC BLOOD PRESSURE: 108 MMHG

## 2019-04-10 DIAGNOSIS — K29.90 GASTRITIS AND DUODENITIS: ICD-10-CM

## 2019-04-10 DIAGNOSIS — R11.2 NON-INTRACTABLE VOMITING WITH NAUSEA, UNSPECIFIED VOMITING TYPE: Primary | ICD-10-CM

## 2019-04-10 LAB
ANION GAP SERPL CALC-SCNC: 11 MMOL/L (ref 3–18)
APPEARANCE UR: CLEAR
BASOPHILS # BLD: 0 K/UL (ref 0–0.1)
BASOPHILS NFR BLD: 0 % (ref 0–2)
BILIRUB UR QL: NEGATIVE
BUN SERPL-MCNC: 8 MG/DL (ref 7–18)
BUN/CREAT SERPL: 8 (ref 12–20)
CALCIUM SERPL-MCNC: 9.7 MG/DL (ref 8.5–10.1)
CHLORIDE SERPL-SCNC: 108 MMOL/L (ref 100–108)
CO2 SERPL-SCNC: 20 MMOL/L (ref 21–32)
COLOR UR: YELLOW
CREAT SERPL-MCNC: 0.98 MG/DL (ref 0.6–1.3)
DIFFERENTIAL METHOD BLD: ABNORMAL
EOSINOPHIL # BLD: 0 K/UL (ref 0–0.4)
EOSINOPHIL NFR BLD: 0 % (ref 0–5)
ERYTHROCYTE [DISTWIDTH] IN BLOOD BY AUTOMATED COUNT: 12.4 % (ref 11.6–14.5)
GLUCOSE SERPL-MCNC: 93 MG/DL (ref 74–99)
GLUCOSE UR STRIP.AUTO-MCNC: NEGATIVE MG/DL
HCG UR QL: NEGATIVE
HCT VFR BLD AUTO: 41.5 % (ref 35–45)
HGB BLD-MCNC: 14.3 G/DL (ref 12–16)
HGB UR QL STRIP: NEGATIVE
KETONES UR QL STRIP.AUTO: NEGATIVE MG/DL
LEUKOCYTE ESTERASE UR QL STRIP.AUTO: ABNORMAL
LYMPHOCYTES # BLD: 2.4 K/UL (ref 0.9–3.6)
LYMPHOCYTES NFR BLD: 22 % (ref 21–52)
MCH RBC QN AUTO: 29.9 PG (ref 24–34)
MCHC RBC AUTO-ENTMCNC: 34.5 G/DL (ref 31–37)
MCV RBC AUTO: 86.6 FL (ref 74–97)
MONOCYTES # BLD: 0.5 K/UL (ref 0.05–1.2)
MONOCYTES NFR BLD: 5 % (ref 3–10)
NEUTS SEG # BLD: 8.1 K/UL (ref 1.8–8)
NEUTS SEG NFR BLD: 73 % (ref 40–73)
NITRITE UR QL STRIP.AUTO: NEGATIVE
PH UR STRIP: 5 [PH] (ref 5–8)
PLATELET # BLD AUTO: 310 K/UL (ref 135–420)
PMV BLD AUTO: 9.5 FL (ref 9.2–11.8)
POTASSIUM SERPL-SCNC: 3.6 MMOL/L (ref 3.5–5.5)
PROT UR STRIP-MCNC: NEGATIVE MG/DL
RBC # BLD AUTO: 4.79 M/UL (ref 4.2–5.3)
SODIUM SERPL-SCNC: 139 MMOL/L (ref 136–145)
SP GR UR REFRACTOMETRY: 1.02 (ref 1–1.03)
UROBILINOGEN UR QL STRIP.AUTO: 1 EU/DL (ref 0.2–1)
WBC # BLD AUTO: 11 K/UL (ref 4.6–13.2)
WBC URNS QL MICRO: NORMAL /HPF (ref 0–5)

## 2019-04-10 PROCEDURE — 80048 BASIC METABOLIC PNL TOTAL CA: CPT

## 2019-04-10 PROCEDURE — 74011000250 HC RX REV CODE- 250: Performed by: EMERGENCY MEDICINE

## 2019-04-10 PROCEDURE — 99284 EMERGENCY DEPT VISIT MOD MDM: CPT

## 2019-04-10 PROCEDURE — 96374 THER/PROPH/DIAG INJ IV PUSH: CPT

## 2019-04-10 PROCEDURE — 74011250636 HC RX REV CODE- 250/636: Performed by: EMERGENCY MEDICINE

## 2019-04-10 PROCEDURE — 96361 HYDRATE IV INFUSION ADD-ON: CPT

## 2019-04-10 PROCEDURE — 81001 URINALYSIS AUTO W/SCOPE: CPT

## 2019-04-10 PROCEDURE — 81025 URINE PREGNANCY TEST: CPT

## 2019-04-10 PROCEDURE — 74011250637 HC RX REV CODE- 250/637: Performed by: EMERGENCY MEDICINE

## 2019-04-10 PROCEDURE — 87086 URINE CULTURE/COLONY COUNT: CPT

## 2019-04-10 PROCEDURE — 85025 COMPLETE CBC W/AUTO DIFF WBC: CPT

## 2019-04-10 RX ORDER — ONDANSETRON 4 MG/1
4 TABLET, ORALLY DISINTEGRATING ORAL
Status: COMPLETED | OUTPATIENT
Start: 2019-04-10 | End: 2019-04-10

## 2019-04-10 RX ORDER — FAMOTIDINE 10 MG/ML
20 INJECTION INTRAVENOUS
Status: COMPLETED | OUTPATIENT
Start: 2019-04-10 | End: 2019-04-10

## 2019-04-10 RX ORDER — ONDANSETRON 4 MG/1
4 TABLET, FILM COATED ORAL
Qty: 12 TAB | Refills: 0 | Status: SHIPPED | OUTPATIENT
Start: 2019-04-10 | End: 2019-04-16

## 2019-04-10 RX ORDER — DICYCLOMINE HYDROCHLORIDE 20 MG/1
20 TABLET ORAL EVERY 6 HOURS
Qty: 20 TAB | Refills: 0 | Status: SHIPPED | OUTPATIENT
Start: 2019-04-10 | End: 2019-04-15

## 2019-04-10 RX ORDER — SODIUM CHLORIDE 0.9 % (FLUSH) 0.9 %
5-40 SYRINGE (ML) INJECTION EVERY 8 HOURS
Status: DISCONTINUED | OUTPATIENT
Start: 2019-04-10 | End: 2019-04-10 | Stop reason: HOSPADM

## 2019-04-10 RX ORDER — FAMOTIDINE 20 MG/1
20 TABLET, FILM COATED ORAL 2 TIMES DAILY
Qty: 30 TAB | Refills: 0 | Status: SHIPPED | OUTPATIENT
Start: 2019-04-10

## 2019-04-10 RX ORDER — SODIUM CHLORIDE 0.9 % (FLUSH) 0.9 %
5-40 SYRINGE (ML) INJECTION AS NEEDED
Status: DISCONTINUED | OUTPATIENT
Start: 2019-04-10 | End: 2019-04-10 | Stop reason: HOSPADM

## 2019-04-10 RX ADMIN — ONDANSETRON 4 MG: 4 TABLET, ORALLY DISINTEGRATING ORAL at 05:33

## 2019-04-10 RX ADMIN — LIDOCAINE HYDROCHLORIDE 50 ML: 20 SOLUTION ORAL; TOPICAL at 06:01

## 2019-04-10 RX ADMIN — FAMOTIDINE 20 MG: 10 INJECTION, SOLUTION INTRAVENOUS at 06:03

## 2019-04-10 RX ADMIN — SODIUM CHLORIDE 500 ML: 900 INJECTION, SOLUTION INTRAVENOUS at 06:00

## 2019-04-10 NOTE — ED TRIAGE NOTES
Pt reports to ED c/c vomiting r/t possible pregnancy, pt is unsure of LMP. Pt also reports anxiety after vomiting multiple times, +ETOH, and marijuana.

## 2019-04-10 NOTE — DISCHARGE INSTRUCTIONS
Patient Education        Nausea and Vomiting: Care Instructions  Your Care Instructions    When you are nauseated, you may feel weak and sweaty and notice a lot of saliva in your mouth. Nausea often leads to vomiting. Most of the time you do not need to worry about nausea and vomiting, but they can be signs of other illnesses. Two common causes of nausea and vomiting are stomach flu and food poisoning. Nausea and vomiting from viral stomach flu will usually start to improve within 24 hours. Nausea and vomiting from food poisoning may last from 12 to 48 hours. The doctor has checked you carefully, but problems can develop later. If you notice any problems or new symptoms, get medical treatment right away. Follow-up care is a key part of your treatment and safety. Be sure to make and go to all appointments, and call your doctor if you are having problems. It's also a good idea to know your test results and keep a list of the medicines you take. How can you care for yourself at home? · To prevent dehydration, drink plenty of fluids, enough so that your urine is light yellow or clear like water. Choose water and other caffeine-free clear liquids until you feel better. If you have kidney, heart, or liver disease and have to limit fluids, talk with your doctor before you increase the amount of fluids you drink. · Rest in bed until you feel better. · When you are able to eat, try clear soups, mild foods, and liquids until all symptoms are gone for 12 to 48 hours. Other good choices include dry toast, crackers, cooked cereal, and gelatin dessert, such as Jell-O. When should you call for help? Call 911 anytime you think you may need emergency care. For example, call if:    · You passed out (lost consciousness).    Call your doctor now or seek immediate medical care if:    · You have symptoms of dehydration, such as:  ? Dry eyes and a dry mouth. ? Passing only a little dark urine. ?  Feeling thirstier than usual.   · You have new or worsening belly pain.     · You have a new or higher fever.     · You vomit blood or what looks like coffee grounds.    Watch closely for changes in your health, and be sure to contact your doctor if:    · You have ongoing nausea and vomiting.     · Your vomiting is getting worse.     · Your vomiting lasts longer than 2 days.     · You are not getting better as expected. Where can you learn more? Go to http://artemio-mely.info/. Enter 25 [id number] in the search box to learn more about \"Nausea and Vomiting: Care Instructions. \"  Current as of: September 23, 2018  Content Version: 11.9  © 7635-7736 [employer]. Care instructions adapted under license by [employer] (which disclaims liability or warranty for this information). If you have questions about a medical condition or this instruction, always ask your healthcare professional. [place] [address] any warranty or liability for your use of this information. Patient Education        Gastritis: Care Instructions  Your Care Instructions    Gastritis is a sore and upset stomach. It happens when something irritates the stomach lining. Many things can cause it. These include an infection such as the flu or something you ate or drank. Medicines or a sore on the lining of the stomach (ulcer) also can cause it. Your belly may bloat and ache. You may belch, vomit, and feel sick to your stomach. You should be able to relieve the problem by taking medicine. And it may help to change your diet. If gastritis lasts, your doctor may prescribe medicine. Follow-up care is a key part of your treatment and safety. Be sure to make and go to all appointments, and call your doctor if you are having problems. It's also a good idea to know your test results and keep a list of the medicines you take. How can you care for yourself at home? · If your doctor prescribed antibiotics, take them as directed. Do not stop taking them just because you feel better. You need to take the full course of antibiotics. · Be safe with medicines. If your doctor prescribed medicine to decrease stomach acid, take it as directed. Call your doctor if you think you are having a problem with your medicine. · Do not take any other medicine, including over-the-counter pain relievers, without talking to your doctor first.  · If your doctor recommends over-the-counter medicine to reduce stomach acid, such as Pepcid AC, Prilosec, Tagamet HB, or Zantac 75, follow the directions on the label. · Drink plenty of fluids (enough so that your urine is light yellow or clear like water) to prevent dehydration. Choose water and other caffeine-free clear liquids. If you have kidney, heart, or liver disease and have to limit fluids, talk with your doctor before you increase the amount of fluids you drink. · Limit how much alcohol you drink. · Avoid coffee, tea, cola drinks, chocolate, and other foods with caffeine. They increase stomach acid. When should you call for help? Call 911 anytime you think you may need emergency care. For example, call if:    · You vomit blood or what looks like coffee grounds.     · You pass maroon or very bloody stools.    Call your doctor now or seek immediate medical care if:    · You start breathing fast and have not produced urine in the last 8 hours.     · You cannot keep fluids down.    Watch closely for changes in your health, and be sure to contact your doctor if:    · You do not get better as expected. Where can you learn more? Go to http://artemio-mely.info/. Enter [id number] in the search box to learn more about \"Gastritis: Care Instructions. \"  Current as of: March 27, 2018  Content Version: 11.9  © 4849-7620 [employer]. Care instructions adapted under license by [employer] (which disclaims liability or warranty for this information).  If you have questions about a medical condition or this instruction, always ask your healthcare professional. [place] [address] any warranty or liability for your use of this information.

## 2019-04-10 NOTE — ED NOTES
Pt in acute anxious distress. Discussed with pt at length about decreasing alcohol/caffiene/smoking to help reduce anxiety. Encouraged exercise and healthier lifestyle tips. Educated and encouraged return demonstration of several grounding techniques, pt verbalized understanding. Pt visibly more relaxed at this time.

## 2019-04-12 LAB
BACTERIA SPEC CULT: NORMAL
SERVICE CMNT-IMP: NORMAL

## 2019-04-16 ENCOUNTER — APPOINTMENT (OUTPATIENT)
Dept: ULTRASOUND IMAGING | Age: 29
End: 2019-04-16
Attending: PHYSICIAN ASSISTANT
Payer: MEDICAID

## 2019-04-16 ENCOUNTER — HOSPITAL ENCOUNTER (EMERGENCY)
Age: 29
Discharge: HOME | End: 2019-04-16
Attending: EMERGENCY MEDICINE
Payer: MEDICAID

## 2019-04-16 VITALS
OXYGEN SATURATION: 100 % | DIASTOLIC BLOOD PRESSURE: 69 MMHG | SYSTOLIC BLOOD PRESSURE: 104 MMHG | WEIGHT: 165 LBS | RESPIRATION RATE: 18 BRPM | HEART RATE: 80 BPM | TEMPERATURE: 98.6 F | BODY MASS INDEX: 26.52 KG/M2 | HEIGHT: 66 IN

## 2019-04-16 DIAGNOSIS — O21.9 NAUSEA AND VOMITING DURING PREGNANCY: ICD-10-CM

## 2019-04-16 DIAGNOSIS — O26.891 PELVIC PAIN AFFECTING PREGNANCY IN FIRST TRIMESTER, ANTEPARTUM: Primary | ICD-10-CM

## 2019-04-16 DIAGNOSIS — R10.2 PELVIC PAIN AFFECTING PREGNANCY IN FIRST TRIMESTER, ANTEPARTUM: Primary | ICD-10-CM

## 2019-04-16 LAB
ALBUMIN SERPL-MCNC: 4 G/DL (ref 3.4–5)
ALBUMIN/GLOB SERPL: 1.3 {RATIO} (ref 0.8–1.7)
ALP SERPL-CCNC: 48 U/L (ref 45–117)
ALT SERPL-CCNC: 19 U/L (ref 13–56)
ANION GAP SERPL CALC-SCNC: 7 MMOL/L (ref 3–18)
APPEARANCE UR: ABNORMAL
AST SERPL-CCNC: 11 U/L (ref 15–37)
BACTERIA URNS QL MICRO: ABNORMAL /HPF
BASOPHILS # BLD: 0 K/UL (ref 0–0.1)
BASOPHILS NFR BLD: 0 % (ref 0–2)
BILIRUB SERPL-MCNC: 1.4 MG/DL (ref 0.2–1)
BILIRUB UR QL: ABNORMAL
BUN SERPL-MCNC: 9 MG/DL (ref 7–18)
BUN/CREAT SERPL: 10 (ref 12–20)
CALCIUM SERPL-MCNC: 9.3 MG/DL (ref 8.5–10.1)
CHLORIDE SERPL-SCNC: 105 MMOL/L (ref 100–108)
CO2 SERPL-SCNC: 26 MMOL/L (ref 21–32)
COLOR UR: ABNORMAL
CREAT SERPL-MCNC: 0.88 MG/DL (ref 0.6–1.3)
DIFFERENTIAL METHOD BLD: NORMAL
EOSINOPHIL # BLD: 0.1 K/UL (ref 0–0.4)
EOSINOPHIL NFR BLD: 1 % (ref 0–5)
EPITH CASTS URNS QL MICRO: ABNORMAL /LPF (ref 0–5)
ERYTHROCYTE [DISTWIDTH] IN BLOOD BY AUTOMATED COUNT: 11.9 % (ref 11.6–14.5)
GLOBULIN SER CALC-MCNC: 3.1 G/DL (ref 2–4)
GLUCOSE SERPL-MCNC: 81 MG/DL (ref 74–99)
GLUCOSE UR STRIP.AUTO-MCNC: NEGATIVE MG/DL
HCG SERPL-ACNC: ABNORMAL MIU/ML (ref 0–10)
HCG UR QL: POSITIVE
HCT VFR BLD AUTO: 41.3 % (ref 35–45)
HGB BLD-MCNC: 14 G/DL (ref 12–16)
HGB UR QL STRIP: NEGATIVE
KETONES UR QL STRIP.AUTO: 40 MG/DL
LEUKOCYTE ESTERASE UR QL STRIP.AUTO: ABNORMAL
LYMPHOCYTES # BLD: 2.2 K/UL (ref 0.9–3.6)
LYMPHOCYTES NFR BLD: 34 % (ref 21–52)
MCH RBC QN AUTO: 29.5 PG (ref 24–34)
MCHC RBC AUTO-ENTMCNC: 33.9 G/DL (ref 31–37)
MCV RBC AUTO: 87.1 FL (ref 74–97)
MONOCYTES # BLD: 0.6 K/UL (ref 0.05–1.2)
MONOCYTES NFR BLD: 9 % (ref 3–10)
MUCOUS THREADS URNS QL MICRO: ABNORMAL /LPF
NEUTS SEG # BLD: 3.6 K/UL (ref 1.8–8)
NEUTS SEG NFR BLD: 56 % (ref 40–73)
NITRITE UR QL STRIP.AUTO: NEGATIVE
PH UR STRIP: 6 [PH] (ref 5–8)
PLATELET # BLD AUTO: 263 K/UL (ref 135–420)
PMV BLD AUTO: 9.8 FL (ref 9.2–11.8)
POTASSIUM SERPL-SCNC: 3.9 MMOL/L (ref 3.5–5.5)
PROT SERPL-MCNC: 7.1 G/DL (ref 6.4–8.2)
PROT UR STRIP-MCNC: ABNORMAL MG/DL
RBC # BLD AUTO: 4.74 M/UL (ref 4.2–5.3)
RBC #/AREA URNS HPF: ABNORMAL /HPF (ref 0–5)
SERVICE CMNT-IMP: NORMAL
SODIUM SERPL-SCNC: 138 MMOL/L (ref 136–145)
SP GR UR REFRACTOMETRY: >1.03 (ref 1–1.03)
UROBILINOGEN UR QL STRIP.AUTO: 1 EU/DL (ref 0.2–1)
WBC # BLD AUTO: 6.4 K/UL (ref 4.6–13.2)
WBC URNS QL MICRO: ABNORMAL /HPF (ref 0–5)
WET PREP GENITAL: NORMAL

## 2019-04-16 PROCEDURE — 87210 SMEAR WET MOUNT SALINE/INK: CPT

## 2019-04-16 PROCEDURE — 81025 URINE PREGNANCY TEST: CPT

## 2019-04-16 PROCEDURE — 81001 URINALYSIS AUTO W/SCOPE: CPT

## 2019-04-16 PROCEDURE — 87491 CHLMYD TRACH DNA AMP PROBE: CPT

## 2019-04-16 PROCEDURE — 85025 COMPLETE CBC W/AUTO DIFF WBC: CPT

## 2019-04-16 PROCEDURE — 99284 EMERGENCY DEPT VISIT MOD MDM: CPT

## 2019-04-16 PROCEDURE — 84702 CHORIONIC GONADOTROPIN TEST: CPT

## 2019-04-16 PROCEDURE — 76817 TRANSVAGINAL US OBSTETRIC: CPT

## 2019-04-16 PROCEDURE — 80053 COMPREHEN METABOLIC PANEL: CPT

## 2019-04-16 RX ORDER — ONDANSETRON 4 MG/1
4 TABLET, ORALLY DISINTEGRATING ORAL
Qty: 12 TAB | Refills: 0 | Status: SHIPPED | OUTPATIENT
Start: 2019-04-16 | End: 2019-04-16

## 2019-04-16 RX ORDER — ONDANSETRON 4 MG/1
4 TABLET, ORALLY DISINTEGRATING ORAL
Qty: 20 TAB | Refills: 0 | Status: SHIPPED | OUTPATIENT
Start: 2019-04-16

## 2019-04-16 NOTE — ED TRIAGE NOTES
Triage: pt seen in ED 6 days ago for nausea and vomiting. STates that pregnancy test in ED was negative. Pt continues to have nausea. Took 2 home pregnancy tests and they were positive. Pt states that she continues to have nausea, concerned that she may be pregnant. Pt reports increased anxiety due to tubal pregnancy 4 years ago. Most recent pregnancy 3 years ago with full-term healthy birth.

## 2019-04-16 NOTE — ED PROVIDER NOTES
EMERGENCY DEPARTMENT HISTORY AND PHYSICAL EXAM 
 
Date: 2019 Patient Name: Clover Torres History of Presenting Illness Chief Complaint Patient presents with  Nausea History Provided By: Patient Chief Complaint: nausea HPI(Context):  
11:12 AM 
Clover Torres is a 33 y.o. female with PMHX of ectopic pregnancy who presents to the emergency department C/O nausea. Associated sxs include pelvic pain. Pt took UPT at home which was positive. Pt is A5. Hx of R sided ectopic. Pt denies vaginal bleeding, vaginal discharge, fever, and any other sxs or complaints. PCP: None Current Outpatient Medications Medication Sig Dispense Refill  ondansetron (ZOFRAN ODT) 4 mg disintegrating tablet Take 1 Tab by mouth every eight (8) hours as needed for Nausea (or vomiting.). Allow to dissolve on tongue 12 Tab 0  
 famotidine (PEPCID AC) 20 mg tablet Take 1 Tab by mouth two (2) times a day. 30 Tab 0  
 calcium carbonate (TUMS E-X) 300 mg (750 mg) chewable tablet Take 1 Tab by mouth three (3) times daily as needed for Indigestion. 30 Tab 0  chlorzoxazone (PARAFON FORTE DSC) 500 mg tablet Take 1 Tab by mouth four (4) times daily as needed for Muscle Spasm(s). 24 Tab 0 Past History Past Medical History: 
Past Medical History:  
Diagnosis Date  Neurological disorder Past Surgical History: 
Past Surgical History:  
Procedure Laterality Date  HX GYN    
 HX LEFT SALPINGO-OOPHORECTOMY   Family History: 
Family History Problem Relation Age of Onset  Heart Disease Maternal Grandmother   
     had a triple bypass 2013  Alcohol abuse Maternal Grandfather  Bleeding Prob Other Breast Cancer Social History: 
Social History Tobacco Use  Smoking status: Former Smoker Packs/day: 0.25 Types: Cigars  Smokeless tobacco: Never Used  Tobacco comment: pt smokes 3/day (black and mild cigars) Substance Use Topics  Alcohol use: Yes Comment: socially  Drug use: Yes Frequency: 2.0 times per week Types: Marijuana Allergies: 
No Known Allergies Review of Systems Review of Systems Constitutional: Negative for chills and fever. Gastrointestinal: Positive for abdominal pain and nausea. Negative for vomiting. Genitourinary: Positive for pelvic pain. Negative for dysuria, flank pain, vaginal bleeding and vaginal discharge. Musculoskeletal: Negative for back pain. Neurological: Negative for dizziness. All other systems reviewed and are negative. Physical Exam  
 
Vitals:  
 04/16/19 1107 BP: 104/69 Pulse: 80 Resp: 18 Temp: 98.6 °F (37 °C) SpO2: 100% Weight: 74.8 kg (165 lb) Height: 5' 6\" (1.676 m) Physical Exam  
Constitutional: She is oriented to person, place, and time. She appears well-developed and well-nourished. No distress. AA female in NAD. Alert. Appears comfortable. HENT:  
Head: Normocephalic and atraumatic. Right Ear: External ear normal.  
Left Ear: External ear normal.  
Nose: Nose normal.  
Mouth/Throat: Oropharynx is clear and moist and mucous membranes are normal.  
Eyes: Conjunctivae are normal.  
Neck: Normal range of motion. Cardiovascular: Normal rate, regular rhythm, normal heart sounds and intact distal pulses. Exam reveals no gallop and no friction rub. No murmur heard. Pulmonary/Chest: Effort normal and breath sounds normal. No accessory muscle usage. No tachypnea. No respiratory distress. She has no decreased breath sounds. She has no wheezes. She has no rhonchi. She has no rales. Abdominal: She exhibits no mass. There is no tenderness. There is no rigidity, no rebound, no guarding, no CVA tenderness and no tenderness at McBurney's point. Genitourinary:  
Genitourinary Comments: Female chaperone in room. See pelvic procedure note. Verbal consent obtained prior to procedure. Musculoskeletal: Normal range of motion. Neurological: She is alert and oriented to person, place, and time. Skin: Skin is warm and dry. No rash noted. She is not diaphoretic. No erythema. Psychiatric: She has a normal mood and affect. Judgment normal.  
Nursing note and vitals reviewed. Diagnostic Study Results Labs - Recent Results (from the past 12 hour(s)) URINALYSIS W/ RFLX MICROSCOPIC Collection Time: 04/16/19 11:15 AM  
Result Value Ref Range Color DARK YELLOW Appearance CLOUDY Specific gravity >1.030 (H) 1.005 - 1.030  
 pH (UA) 6.0 5.0 - 8.0 Protein TRACE (A) NEG mg/dL Glucose NEGATIVE  NEG mg/dL Ketone 40 (A) NEG mg/dL Bilirubin SMALL (A) NEG Blood NEGATIVE  NEG Urobilinogen 1.0 0.2 - 1.0 EU/dL Nitrites NEGATIVE  NEG Leukocyte Esterase TRACE (A) NEG URINE MICROSCOPIC ONLY Collection Time: 04/16/19 11:15 AM  
Result Value Ref Range WBC 0 to 3 0 - 5 /hpf  
 RBC NONE 0 - 5 /hpf Epithelial cells 4+ 0 - 5 /lpf Bacteria 1+ (A) NEG /hpf Mucus 1+ (A) NEG /lpf  
HCG URINE, QL. - POC Collection Time: 04/16/19 11:18 AM  
Result Value Ref Range Pregnancy test,urine (POC) POSITIVE (A) NEG    
CBC WITH AUTOMATED DIFF Collection Time: 04/16/19 11:40 AM  
Result Value Ref Range WBC 6.4 4.6 - 13.2 K/uL  
 RBC 4.74 4.20 - 5.30 M/uL  
 HGB 14.0 12.0 - 16.0 g/dL HCT 41.3 35.0 - 45.0 % MCV 87.1 74.0 - 97.0 FL  
 MCH 29.5 24.0 - 34.0 PG  
 MCHC 33.9 31.0 - 37.0 g/dL  
 RDW 11.9 11.6 - 14.5 % PLATELET 603 701 - 991 K/uL MPV 9.8 9.2 - 11.8 FL  
 NEUTROPHILS 56 40 - 73 % LYMPHOCYTES 34 21 - 52 % MONOCYTES 9 3 - 10 % EOSINOPHILS 1 0 - 5 % BASOPHILS 0 0 - 2 %  
 ABS. NEUTROPHILS 3.6 1.8 - 8.0 K/UL  
 ABS. LYMPHOCYTES 2.2 0.9 - 3.6 K/UL  
 ABS. MONOCYTES 0.6 0.05 - 1.2 K/UL  
 ABS. EOSINOPHILS 0.1 0.0 - 0.4 K/UL  
 ABS. BASOPHILS 0.0 0.0 - 0.1 K/UL  
 DF AUTOMATED METABOLIC PANEL, COMPREHENSIVE  Collection Time: 04/16/19 11:40 AM  
 Result Value Ref Range Sodium 138 136 - 145 mmol/L Potassium 3.9 3.5 - 5.5 mmol/L Chloride 105 100 - 108 mmol/L  
 CO2 26 21 - 32 mmol/L Anion gap 7 3.0 - 18 mmol/L Glucose 81 74 - 99 mg/dL BUN 9 7.0 - 18 MG/DL Creatinine 0.88 0.6 - 1.3 MG/DL  
 BUN/Creatinine ratio 10 (L) 12 - 20 GFR est AA >60 >60 ml/min/1.73m2 GFR est non-AA >60 >60 ml/min/1.73m2 Calcium 9.3 8.5 - 10.1 MG/DL Bilirubin, total 1.4 (H) 0.2 - 1.0 MG/DL  
 ALT (SGPT) 19 13 - 56 U/L  
 AST (SGOT) 11 (L) 15 - 37 U/L Alk. phosphatase 48 45 - 117 U/L Protein, total 7.1 6.4 - 8.2 g/dL Albumin 4.0 3.4 - 5.0 g/dL Globulin 3.1 2.0 - 4.0 g/dL A-G Ratio 1.3 0.8 - 1.7 BETA HCG, QT Collection Time: 04/16/19 11:40 AM  
Result Value Ref Range Beta HCG, QT 16,442 (H) 0 - 10 MIU/ML  
WET PREP Collection Time: 04/16/19 11:45 AM  
Result Value Ref Range Special Requests: NO SPECIAL REQUESTS Wet prep NO YEAST,TRICHOMONAS OR CLUE CELLS NOTED    
 
 
US UTS TRANSVAGINAL OB Final Result IMPRESSION:  
  
1. Intrauterine pregnancy of unknown viability. No fetal pole demonstrated. Findings likely secondary to early gestation. Dedicated clinical follow-up,  
trending of beta hCG levels, and repeat ultrasound assessment to document viable  
intrauterine pregnancy recommended as appropriate clinically. 2. Normal blood flow to each ovary. 3. Simple appearing bilateral ovarian cysts. 4. Trace amount of pelvic fluid, likely physiologic CT Results  (Last 48 hours) None CXR Results  (Last 48 hours) None Medications given in the ED- Medications - No data to display Medical Decision Making I am the first provider for this patient. I reviewed the vital signs, available nursing notes, past medical history, past surgical history, family history and social history. Vital Signs-Reviewed the patient's vital signs.  
 
Pulse Oximetry Analysis - 100% on RA  
 
 Records Reviewed: Nursing Notes and Old Medical Records Provider Notes (Medical Decision Making): ectopic, miscarriage, STI, UTI Procedures: 
Pelvic Exam 
Date/Time: 4/16/2019 11:52 AM 
Performed by: PA 
Procedure duration:  15 minutes. Documented by: Chad Joshi PA-C. Exam assisted by:  Female chaperone in room. Type of exam performed: bimanual and speculum. External genitalia appearance: normal.   
Vaginal exam:  discharge. The amount of discharge was:  mild. The discharge was white and thick. Cervical exam:  normal.   
Specimen(s) collected:  chlamydia, GC and vaginal culture. Bimanual exam:  normal.   
Patient tolerance: Patient tolerated the procedure well with no immediate complications ED Course:  
11:12 AM Initial assessment performed. The patients presenting problems have been discussed, and they are in agreement with the care plan formulated and outlined with them. I have encouraged them to ask questions as they arise throughout their visit. Diagnosis and Disposition US non-diagnostic but pt likely too early. HCG [id number]. No vaginal bleeding. Ectopic precautions reviewed. RTED in 48-72 hours for recheck. OB FU. Prenatals. Safer meds in pregnancy discussed. Reasons to RTED discussed with pt. All questions answered. Pt feels comfortable going home at this time. Pt expressed understanding and she agrees with plan. 1. Pelvic pain affecting pregnancy in first trimester, antepartum 2. Nausea and vomiting during pregnancy PLAN: 
1. D/C Home 2. Current Discharge Medication List  
  
CONTINUE these medications which have CHANGED Details  
ondansetron (ZOFRAN ODT) 4 mg disintegrating tablet Take 1 Tab by mouth every eight (8) hours as needed for Nausea (or vomiting.). Allow to dissolve on tongue Qty: 12 Tab, Refills: 0  
  
  
 
3. Follow-up Information Follow up With Specialties Details Why Contact Info [facility] EMERGENCY DEPT Emergency Medicine  return to emergency department in 48-72 hours for recheck [address] [address] 
[phone] Miguel A Vigil MD Obstetrics & Gynecology, Gynecology, Obstetrics   [address] [facility] [address] [place] [address] 
[phone] 
  
  
 
_______________________________ Attestations: This note is prepared by Charito Li PA-C. 
_______________________________ Please note that this dictation was completed with Rico, the computer voice recognition software. Quite often unanticipated grammatical, syntax, homophones, and other interpretive errors are inadvertently transcribed by the computer software. Please disregard these errors. Please excuse any errors that have escaped final proofreading.

## 2019-04-17 LAB
C TRACH RRNA SPEC QL NAA+PROBE: NEGATIVE
N GONORRHOEA RRNA SPEC QL NAA+PROBE: NEGATIVE
SPECIMEN SOURCE: NORMAL

## 2019-05-01 NOTE — ED PROVIDER NOTES
EMERGENCY DEPARTMENT HISTORY AND PHYSICAL EXAM 
 
Date: 4/10/2019 Patient Name: Percy Alfonso History of Presenting Illness Chief Complaint Patient presents with  Vomiting  Anxiety History Provided By: Patient Additional History (Context):  
5:37 AM 
Percy Alfonso is a  is a A2 (ectopic x 2) 
 33 y.o. female with PMHX of recurrrent epigastric pain, gastritis who presents to the emergency department C/O vomiting, she has concerns about possible pregnancy, pt is unsure of LMP. She also states she feels very nervous, anxious after vomiting multiple times, especially admitting to recent use of alcohol and marijuana. She frequently drinks coffee, soda as well as smoking TOB, MJ, and drinking alcohol (likely contributors to almost certain gastritis). She has no diarrhea or blood per rectum, no fevers, no SOB, cough. Social Hx Pos of Tob, MJ, EtOH. No cocaine or heroin Family hx Heart disease and alcohol abuse on her mother's side PCP: None Current Outpatient Medications Medication Sig Dispense Refill  famotidine (PEPCID AC) 20 mg tablet Take 1 Tab by mouth two (2) times a day. 30 Tab 0  
 ondansetron (ZOFRAN ODT) 4 mg disintegrating tablet Take 1 Tab by mouth every eight (8) hours as needed for Nausea (or vomiting.). Allow to dissolve on tongue 20 Tab 0  
 calcium carbonate (TUMS E-X) 300 mg (750 mg) chewable tablet Take 1 Tab by mouth three (3) times daily as needed for Indigestion. 30 Tab 0  chlorzoxazone (PARAFON FORTE DSC) 500 mg tablet Take 1 Tab by mouth four (4) times daily as needed for Muscle Spasm(s). 24 Tab 0 Past History Past Medical History: 
Past Medical History:  
Diagnosis Date  Neurological disorder Past Surgical History: 
Past Surgical History:  
Procedure Laterality Date  HX GYN    
 HX LEFT SALPINGO-OOPHORECTOMY   Family History: 
Family History Problem Relation Age of Onset  Heart Disease Maternal Grandmother   
     had a triple bypass 6/2013  Alcohol abuse Maternal Grandfather  Bleeding Prob Other Breast Cancer Social History: 
Social History Tobacco Use  Smoking status: Former Smoker Packs/day: 0.25 Types: Cigars  Smokeless tobacco: Never Used  Tobacco comment: pt smokes 3/day (black and mild cigars) Substance Use Topics  Alcohol use: Yes Comment: socially  Drug use: Yes Frequency: 2.0 times per week Types: Marijuana Allergies: 
No Known Allergies Review of Systems Review of Systems Constitutional: Negative for activity change, appetite change, chills, diaphoresis, fever and unexpected weight change. HENT: Positive for sore throat. Negative for congestion, drooling, ear pain, rhinorrhea, tinnitus and trouble swallowing. Eyes: Negative for photophobia, pain, redness and visual disturbance. Respiratory: Negative for cough, choking, chest tightness, shortness of breath, wheezing and stridor. Cardiovascular: Negative for chest pain, palpitations and leg swelling. Gastrointestinal: Positive for abdominal pain, nausea and vomiting. Negative for abdominal distention, anal bleeding, blood in stool, constipation and diarrhea. Endocrine: Negative for cold intolerance, heat intolerance, polydipsia and polyuria. Genitourinary: Negative for difficulty urinating, dysuria, flank pain, frequency, hematuria, menstrual problem, pelvic pain, urgency, vaginal discharge and vaginal pain. Musculoskeletal: Negative for arthralgias, back pain and neck pain. Skin: Negative for color change, rash and wound. Allergic/Immunologic: Negative for immunocompromised state. Neurological: Negative for dizziness, seizures, syncope, speech difficulty, light-headedness and headaches. Hematological: Does not bruise/bleed easily.   
Psychiatric/Behavioral: Negative for agitation, behavioral problems, hallucinations, self-injury and suicidal ideas. The patient is not hyperactive. All other systems reviewed and are negative. Physical Exam  
 
Vitals:  
 04/10/19 0509 04/10/19 0700 04/10/19 9883 BP: 100/80 108/68 108/68 Pulse: 79  76 Resp: 16  16 Temp: 97.5 °F (36.4 °C)  97.8 °F (36.6 °C) SpO2: 100% 100% 100% Weight: 74.8 kg (165 lb) Height: 5' 6\" (1.676 m) Physical Exam  
Constitutional: She is oriented to person, place, and time. She appears well-developed and well-nourished. No distress. HENT:  
Head: Normocephalic and atraumatic. Right Ear: External ear normal.  
Left Ear: External ear normal.  
Mouth/Throat: Oropharynx is clear and moist. No oropharyngeal exudate. Eyes: Pupils are equal, round, and reactive to light. Conjunctivae and EOM are normal. No scleral icterus. No pallor Neck: Normal range of motion. Neck supple. No JVD present. No tracheal deviation present. No thyromegaly present. Cardiovascular: Normal rate, regular rhythm and normal heart sounds. Pulmonary/Chest: Effort normal and breath sounds normal. No stridor. No respiratory distress. Abdominal: Soft. Bowel sounds are normal. She exhibits no distension. There is tenderness in the epigastric area. There is no rigidity, no rebound, no guarding, no CVA tenderness, no tenderness at McBurney's point and negative Boggs's sign. Musculoskeletal: Normal range of motion. She exhibits no edema or tenderness. No soft tissue injuries Lymphadenopathy:  
  She has no cervical adenopathy. Neurological: She is alert and oriented to person, place, and time. She has normal reflexes. No cranial nerve deficit. Coordination normal.  
Skin: Skin is warm and dry. No rash noted. She is not diaphoretic. No erythema. Psychiatric: Her speech is normal and behavior is normal. Judgment and thought content normal. Her mood appears anxious.  Her affect is not labile and not inappropriate. Cognition and memory are normal.  
Nursing note and vitals reviewed. Diagnostic Study Results Labs - Lab Results  
 
   
Contains abnormal data CBC WITH AUTOMATED DIFF (Final result)  
 Component (Lab Inquiry) Collection Time Result Time WBC RBC HGB HCT MCV MCH MCHC RDW PLT MPLV  
04/10/19 05:50:00 04/10/19 06:11:32 11.0 4.79 14.3 41.5 86.6 29.9 34.5 12.4 310 9.5 Previous Results 01/12/19 06:45:00 01/12/19 07:00:02 6.0 4.94 14.4 43.4 87.9 29.1 33.2 12.3 229 10.1  
03/21/10 05:10:00 03/21/10 05:34:01    31.3Low         
03/21/10 05:10:00 03/21/10 05:34:01   10.1Low          
03/20/10 04:10:00 03/20/10 04:24:37 10.6 3.73Low  10.8Low  33.5Low  89.9 29.0 32.3 13.3 187 7.2Low   
   
Collection Time Result Time GRANS LYMPH MONOS EOS BASOS ABG ABL ABM LUCERO ABB  
04/10/19 05:50:00 04/10/19 06:11:32 73 22 5 0 0 8. 1High  2.4 0.5 0.0 0.0 Previous Results 01/12/19 06:45:00 01/12/19 07:00:02 50 39 8 2 1 3.0 2.3 0.5 0.1 0.1  
03/21/10 05:10:00 03/21/10 05:34:01            
03/21/10 05:10:00 03/21/10 05:34:01            
03/20/10 04:10:00 03/20/10 04:24:37 72 19Low  8 1 0 7.5 2.1 0.9 0.1   
   
Collection Time Result Time DF  
04/10/19 05:50:00 04/10/19 06:11:32 AUTOMATED Previous Results 01/12/19 06:45:00 01/12/19 07:00:02 AUTOMATED  
03/21/10 05:10:00 03/21/10 05:34:01   
03/21/10 05:10:00 03/21/10 05:34:01   
03/20/10 04:10:00 03/20/10 04:24:37 AUTOMATED  
   
Final result  
   
  
  
  
   
   
Contains abnormal data METABOLIC PANEL, BASIC (Final result)  
 Component (Lab Inquiry) Collection Time Result Time NA K CL CO2 AGAP GLU BUN CREA BUCR GFRAA  
04/10/19 05:50:00 04/10/19 06:19:40 139 3.6 108 20Low  11 93 8 0.98 8Low  >60 Previous Results 01/12/19 06:45:00 01/12/19 07:10:21 144 3.9 111High  25 8 78 11 0.84 13 >60  
   
Collection Time Result Time GFRNA CA  
04/10/19 05:50:00 04/10/19 06:19:40 >60 
(NOTE) Estimated GFR . .. 9.7 Previous Results 01/12/19 06:45:00 01/12/19 07:10:21 >60 
(NOTE) Estimated GFR . .. 9.0  
   
Final result  
   
  
  
  
   
   
HCG URINE, QL. - POC (Final result)  
 Component (Lab Inquiry) Collection Time Result Time [facility]  
04/10/19 05:38:00 04/10/19 07:43:51 NEGATIVE Test results should be. .. Previous Results 12/27/18 09:09:00 12/27/18 09:11:32 NEGATIVE Test results should be. ..  
 
   
Final result  
   
  
  
  
   
 
   
Contains abnormal data URINALYSIS W/ RFLX MICROSCOPIC (Final result)  
 Component (Lab Inquiry) Collection Time Result Time COLOR APPRN SPGRU JOANIE PROTU GLUCU KETU BILU BLDU UROU  
04/10/19 05:15:00 04/10/19 05:53:12 YELLOW CLEAR 1.023 5.0 NEGATIVE  NEGATIVE  NEGATIVE  NEGATIVE  NEGATIVE  1.0 Previous Results 01/12/19 06:30:00 01/12/19 07:19:30 YELLOW CLEAR 1.020 8.0 NEGATIVE  NEGATIVE  15Abnormal  NEGATIVE  TRACEAbnormal  0.2  
12/27/18 09:05:00 12/27/18 09:49:30 YELLOW CLEAR 1.021 5.0 NEGATIVE  NEGATIVE  NEGATIVE  NEGATIVE  MODERATEAbnormal  0.2  
   
Collection Time Result Time ARTIS LEUKU  
04/10/19 05:15:00 04/10/19 05:53:12 NEGATIVE  TRACEAbnormal   
Previous Results 01/12/19 06:30:00 01/12/19 07:19:30 NEGATIVE  NEGATIVE   
12/27/18 09:05:00 12/27/18 09:49:30 NEGATIVE  NEGATIVE   
   
Final result  
   
  
  
  
   
   
URINE MICROSCOPIC ONLY (Final result)  
 Component (Lab Inquiry) Collection Time Result Time WBCU  
04/10/19 05:15:00 04/10/19 05:53:12 5 to 10 Previous Results 01/12/19 06:30:00 01/12/19 07:19:30 0 to 3  
12/27/18 09:05:00 12/27/18 09:49:30 0 to 2  
 
   
Final result Radiologic Studies - No orders to display CT Results  (Last 48 hours) None CXR Results  (Last 48 hours) None Medications given in the ED- Medications  
ondansetron (ZOFRAN ODT) tablet 4 mg (4 mg Oral Given 4/10/19 0574) mylanta/viscous lidocaine (GI COCKTAIL) (50 mL Oral Given 4/10/19 0601) famotidine (PF) (PEPCID) injection 20 mg (20 mg IntraVENous Given 4/10/19 4686)  
sodium chloride 0.9 % bolus infusion 500 mL (0 mL IntraVENous IV Completed 4/10/19 2441) Medical Decision Making I am the first provider for this patient. I reviewed the vital signs, available nursing notes, past medical history, past surgical history, family history and social history. Vital Signs-Reviewed the patient's vital signs. Pulse Oximetry Analysis -100% on room air Records Reviewed: NURSING NOTES AND PREVIOUS MEDICAL RECORDS Provider Notes (Medical Decision Making): Recurrent vomiting and gastric pains likely secondary to lifestyle choices of alcohol, smoking, and marijuana use combining to cause symptoms today. They hospitals to change his shoes as is do not necessarily suggest perforation pancreatitis or aspiration. There is concern of pregnancy she is uncertain for her menstrual cycles and her previous OB history has been complicated. Since she is uncertain of her dates we encouraged her to follow-up with her regular doctor and continue to take pregnancy tests to ascertain her current status. There are no other finding to suggest bowel perforation blockage of ovarian torsion or aspiration. She is appropriate for PEs discharge Procedures: 
Procedures ED Course:  
5:37 AM: Initial assessment performed. The patients presenting problems have been discussed, and they are in agreement with the care plan formulated and outlined with them. I have encouraged them to ask questions as they arise throughout their visit. Diagnosis and Disposition DISCHARGE NOTE: 
7:15 AM 
Kaley Russ's  results have been reviewed with her. She has been counseled regarding her diagnosis, treatment, and plan. She verbally conveys understanding and agreement of the signs, symptoms, diagnosis, treatment and prognosis and additionally agrees to follow up as discussed. She also agrees with the care-plan and conveys that all of her questions have been answered. I have also provided discharge instructions for her that include: educational information regarding their diagnosis and treatment, and list of reasons why they would want to return to the ED prior to their follow-up appointment, should her condition change. She has been provided with education for proper emergency department utilization. CLINICAL IMPRESSION: 
 
1. Non-intractable vomiting with nausea, unspecified vomiting type 2. Gastritis and duodenitis PLAN: 
1. D/C Home 2. Discharge Medication List as of 4/10/2019  7:31 AM  
  
START taking these medications Details  
famotidine (PEPCID AC) 20 mg tablet Take 1 Tab by mouth two (2) times a day., Normal, Disp-30 Tab, R-0  
  
dicyclomine (BENTYL) 20 mg tablet Take 1 Tab by mouth every six (6) hours for 20 doses. , Normal, Disp-20 Tab, R-0  
  
ondansetron hcl (ZOFRAN) 4 mg tablet Take 1 Tab by mouth every eight (8) hours as needed for Nausea., Normal, Disp-12 Tab, R-0  
  
  
CONTINUE these medications which have NOT CHANGED Details  
calcium carbonate (TUMS E-X) 300 mg (750 mg) chewable tablet Take 1 Tab by mouth three (3) times daily as needed for Indigestion. , Print, Disp-30 Tab, R-0  
  
chlorzoxazone (PARAFON FORTE DSC) 500 mg tablet Take 1 Tab by mouth four (4) times daily as needed for Muscle Spasm(s). , Print, Disp-24 Tab, R-0  
  
ondansetron (ZOFRAN ODT) 4 mg disintegrating tablet Take 1-2 tablets every 6-8 hours as needed for nausea and vomiting., Print, Disp-10 Tab, R-0  
  
  
 
3. Follow-up Information Follow up With Specialties Details Why Contact Info Other, MD Alan  In 2 weeks  Patient can only remember the practice name and not the physician [facility] Emergency Medicine  As needed, If symptoms worsen [address] [place] [id number] 
[phone]  
  
 
_______________________________ This note was partially transcribed via voice recognition software. Although efforts have been made to catch any discrepancies, it may contain sound alike words, grammatical errors, or nonsensical words.

## 2019-11-13 ENCOUNTER — HOSPITAL ENCOUNTER (EMERGENCY)
Age: 29
Discharge: HOME | End: 2019-11-13
Attending: EMERGENCY MEDICINE
Payer: MEDICAID

## 2019-11-13 ENCOUNTER — HOSPITAL ENCOUNTER (EMERGENCY)
Dept: ULTRASOUND IMAGING | Age: 29
Discharge: HOME | End: 2019-11-13
Attending: EMERGENCY MEDICINE
Payer: MEDICAID

## 2019-11-13 VITALS
HEIGHT: 66 IN | TEMPERATURE: 97.2 F | SYSTOLIC BLOOD PRESSURE: 97 MMHG | DIASTOLIC BLOOD PRESSURE: 58 MMHG | WEIGHT: 156 LBS | HEART RATE: 73 BPM | RESPIRATION RATE: 23 BRPM | BODY MASS INDEX: 25.07 KG/M2 | OXYGEN SATURATION: 100 %

## 2019-11-13 DIAGNOSIS — O21.9 NAUSEA AND VOMITING DURING PREGNANCY PRIOR TO 22 WEEKS GESTATION: ICD-10-CM

## 2019-11-13 DIAGNOSIS — Z33.1 IUP (INTRAUTERINE PREGNANCY), INCIDENTAL: Primary | ICD-10-CM

## 2019-11-13 DIAGNOSIS — K29.90 GASTRITIS AND DUODENITIS: ICD-10-CM

## 2019-11-13 DIAGNOSIS — F41.9 ANXIETY: ICD-10-CM

## 2019-11-13 LAB
ALBUMIN SERPL-MCNC: 4.4 G/DL (ref 3.4–5)
ALBUMIN/GLOB SERPL: 1.3 {RATIO} (ref 0.8–1.7)
ALP SERPL-CCNC: 47 U/L (ref 45–117)
ALT SERPL-CCNC: 27 U/L (ref 13–56)
AMPHET UR QL SCN: NEGATIVE
ANION GAP SERPL CALC-SCNC: 10 MMOL/L (ref 3–18)
APPEARANCE UR: CLEAR
AST SERPL-CCNC: 18 U/L (ref 10–38)
BARBITURATES UR QL SCN: NEGATIVE
BASOPHILS # BLD: 0 K/UL (ref 0–0.1)
BASOPHILS NFR BLD: 0 % (ref 0–2)
BENZODIAZ UR QL: NEGATIVE
BILIRUB SERPL-MCNC: 1.8 MG/DL (ref 0.2–1)
BILIRUB UR QL: ABNORMAL
BUN SERPL-MCNC: 8 MG/DL (ref 7–18)
BUN/CREAT SERPL: 9 (ref 12–20)
CALCIUM SERPL-MCNC: 9.6 MG/DL (ref 8.5–10.1)
CANNABINOIDS UR QL SCN: POSITIVE
CHLORIDE SERPL-SCNC: 105 MMOL/L (ref 100–111)
CO2 SERPL-SCNC: 22 MMOL/L (ref 21–32)
COCAINE UR QL SCN: NEGATIVE
COLOR UR: ABNORMAL
CREAT SERPL-MCNC: 0.91 MG/DL (ref 0.6–1.3)
DIFFERENTIAL METHOD BLD: ABNORMAL
EOSINOPHIL # BLD: 0 K/UL (ref 0–0.4)
EOSINOPHIL NFR BLD: 0 % (ref 0–5)
EPITH CASTS URNS QL MICRO: ABNORMAL /LPF (ref 0–5)
ERYTHROCYTE [DISTWIDTH] IN BLOOD BY AUTOMATED COUNT: 11.9 % (ref 11.6–14.5)
ETHANOL SERPL-MCNC: <3 MG/DL (ref 0–3)
GASTROCULT GAST QL: POSITIVE
GLOBULIN SER CALC-MCNC: 3.3 G/DL (ref 2–4)
GLUCOSE SERPL-MCNC: 97 MG/DL (ref 74–99)
GLUCOSE UR STRIP.AUTO-MCNC: NEGATIVE MG/DL
HCG SERPL-ACNC: ABNORMAL MIU/ML (ref 0–10)
HCG UR QL: POSITIVE
HCT VFR BLD AUTO: 43 % (ref 35–45)
HDSCOM,HDSCOM: ABNORMAL
HEMOCCULT STL QL: NEGATIVE
HGB BLD-MCNC: 15.5 G/DL (ref 12–16)
HGB UR QL STRIP: NEGATIVE
KETONES UR QL STRIP.AUTO: >160 MG/DL
LEUKOCYTE ESTERASE UR QL STRIP.AUTO: ABNORMAL
LIPASE SERPL-CCNC: 116 U/L (ref 73–393)
LYMPHOCYTES # BLD: 1.4 K/UL (ref 0.9–3.6)
LYMPHOCYTES NFR BLD: 14 % (ref 21–52)
MAGNESIUM SERPL-MCNC: 1.8 MG/DL (ref 1.6–2.6)
MCH RBC QN AUTO: 30.9 PG (ref 24–34)
MCHC RBC AUTO-ENTMCNC: 36 G/DL (ref 31–37)
MCV RBC AUTO: 85.8 FL (ref 74–97)
METHADONE UR QL: NEGATIVE
MONOCYTES # BLD: 0.3 K/UL (ref 0.05–1.2)
MONOCYTES NFR BLD: 3 % (ref 3–10)
MUCOUS THREADS URNS QL MICRO: ABNORMAL /LPF
NEUTS SEG # BLD: 8.1 K/UL (ref 1.8–8)
NEUTS SEG NFR BLD: 83 % (ref 40–73)
NITRITE UR QL STRIP.AUTO: NEGATIVE
OPIATES UR QL: NEGATIVE
PCP UR QL: NEGATIVE
PH GAST: 3 [PH] (ref 1.5–3.5)
PH UR STRIP: 6 [PH] (ref 5–8)
PLATELET # BLD AUTO: 267 K/UL (ref 135–420)
PMV BLD AUTO: 10 FL (ref 9.2–11.8)
POTASSIUM SERPL-SCNC: 3.7 MMOL/L (ref 3.5–5.5)
PROT SERPL-MCNC: 7.7 G/DL (ref 6.4–8.2)
PROT UR STRIP-MCNC: 30 MG/DL
RBC # BLD AUTO: 5.01 M/UL (ref 4.2–5.3)
RBC #/AREA URNS HPF: ABNORMAL /HPF (ref 0–5)
SODIUM SERPL-SCNC: 137 MMOL/L (ref 136–145)
SP GR UR REFRACTOMETRY: 1.03 (ref 1–1.03)
UROBILINOGEN UR QL STRIP.AUTO: 1 EU/DL (ref 0.2–1)
WBC # BLD AUTO: 9.8 K/UL (ref 4.6–13.2)
WBC URNS QL MICRO: ABNORMAL /HPF (ref 0–4)

## 2019-11-13 PROCEDURE — 82271 OCCULT BLOOD OTHER SOURCES: CPT

## 2019-11-13 PROCEDURE — 82272 OCCULT BLD FECES 1-3 TESTS: CPT

## 2019-11-13 PROCEDURE — 96375 TX/PRO/DX INJ NEW DRUG ADDON: CPT

## 2019-11-13 PROCEDURE — 83735 ASSAY OF MAGNESIUM: CPT

## 2019-11-13 PROCEDURE — 80053 COMPREHEN METABOLIC PANEL: CPT

## 2019-11-13 PROCEDURE — 93975 VASCULAR STUDY: CPT

## 2019-11-13 PROCEDURE — 84702 CHORIONIC GONADOTROPIN TEST: CPT

## 2019-11-13 PROCEDURE — 80307 DRUG TEST PRSMV CHEM ANLYZR: CPT

## 2019-11-13 PROCEDURE — 83690 ASSAY OF LIPASE: CPT

## 2019-11-13 PROCEDURE — 85025 COMPLETE CBC W/AUTO DIFF WBC: CPT

## 2019-11-13 PROCEDURE — 81025 URINE PREGNANCY TEST: CPT

## 2019-11-13 PROCEDURE — 74011250636 HC RX REV CODE- 250/636: Performed by: EMERGENCY MEDICINE

## 2019-11-13 PROCEDURE — 81001 URINALYSIS AUTO W/SCOPE: CPT

## 2019-11-13 PROCEDURE — 99285 EMERGENCY DEPT VISIT HI MDM: CPT

## 2019-11-13 PROCEDURE — 96366 THER/PROPH/DIAG IV INF ADDON: CPT

## 2019-11-13 PROCEDURE — 74011000258 HC RX REV CODE- 258: Performed by: EMERGENCY MEDICINE

## 2019-11-13 PROCEDURE — 96365 THER/PROPH/DIAG IV INF INIT: CPT

## 2019-11-13 RX ORDER — ONDANSETRON 2 MG/ML
4 INJECTION INTRAMUSCULAR; INTRAVENOUS
Status: COMPLETED | OUTPATIENT
Start: 2019-11-13 | End: 2019-11-13

## 2019-11-13 RX ORDER — DOXYLAMINE SUCCINATE AND PYRIDOXINE HYDROCHLORIDE, DELAYED RELEASE TABLETS 10 MG/10 MG 10; 10 MG/1; MG/1
1 TABLET, DELAYED RELEASE ORAL
Qty: 14 TAB | Refills: 0 | Status: SHIPPED | OUTPATIENT
Start: 2019-11-13 | End: 2019-11-27

## 2019-11-13 RX ORDER — DEXTROSE MONOHYDRATE AND SODIUM CHLORIDE 5; .9 G/100ML; G/100ML
250 INJECTION, SOLUTION INTRAVENOUS ONCE
Status: COMPLETED | OUTPATIENT
Start: 2019-11-13 | End: 2019-11-13

## 2019-11-13 RX ORDER — FAMOTIDINE 10 MG/ML
20 INJECTION INTRAVENOUS
Status: COMPLETED | OUTPATIENT
Start: 2019-11-13 | End: 2019-11-13

## 2019-11-13 RX ADMIN — SODIUM CHLORIDE 1000 ML: 900 INJECTION, SOLUTION INTRAVENOUS at 03:19

## 2019-11-13 RX ADMIN — FAMOTIDINE 20 MG: 10 INJECTION, SOLUTION INTRAVENOUS at 03:23

## 2019-11-13 RX ADMIN — ONDANSETRON 4 MG: 2 INJECTION INTRAMUSCULAR; INTRAVENOUS at 03:22

## 2019-11-13 RX ADMIN — DEXTROSE MONOHYDRATE AND SODIUM CHLORIDE 250 ML/HR: 5; .9 INJECTION, SOLUTION INTRAVENOUS at 05:56

## 2019-11-13 NOTE — ED NOTES
Patient is resting on stretcher on her phone. Patient is not showing any signs of distress at this time.

## 2019-11-13 NOTE — ED NOTES
Patient came in with complaint of anxiety. Patient says \"My heart is racing, I have not been able to drink or eat for the past 3 days\".

## 2019-11-13 NOTE — ED TRIAGE NOTES
Patient came in by medic. Pt is A/Ox4 presenting with nausea, vomiting, and anxiety. Patient states \"I have a history of anxiety and panic attacks. I am under a lot of stress because I got a new house and my  lost his job\".

## 2019-11-13 NOTE — DISCHARGE INSTRUCTIONS
Patient Education        Gastritis: Care Instructions  Your Care Instructions    Gastritis is a sore and upset stomach. It happens when something irritates the stomach lining. Many things can cause it. These include an infection such as the flu or something you ate or drank. Medicines or a sore on the lining of the stomach (ulcer) also can cause it. Your belly may bloat and ache. You may belch, vomit, and feel sick to your stomach. You should be able to relieve the problem by taking medicine. And it may help to change your diet. If gastritis lasts, your doctor may prescribe medicine. Follow-up care is a key part of your treatment and safety. Be sure to make and go to all appointments, and call your doctor if you are having problems. It's also a good idea to know your test results and keep a list of the medicines you take. How can you care for yourself at home? · If your doctor prescribed antibiotics, take them as directed. Do not stop taking them just because you feel better. You need to take the full course of antibiotics. · Be safe with medicines. If your doctor prescribed medicine to decrease stomach acid, take it as directed. Call your doctor if you think you are having a problem with your medicine. · Do not take any other medicine, including over-the-counter pain relievers, without talking to your doctor first.  · If your doctor recommends over-the-counter medicine to reduce stomach acid, such as Pepcid AC, Prilosec, Tagamet HB, or Zantac 75, follow the directions on the label. · Drink plenty of fluids (enough so that your urine is light yellow or clear like water) to prevent dehydration. Choose water and other caffeine-free clear liquids. If you have kidney, heart, or liver disease and have to limit fluids, talk with your doctor before you increase the amount of fluids you drink. · Limit how much alcohol you drink. · Avoid coffee, tea, cola drinks, chocolate, and other foods with caffeine.  They increase stomach acid. When should you call for help? Call 911 anytime you think you may need emergency care. For example, call if:    · You vomit blood or what looks like coffee grounds.     · You pass maroon or very bloody stools.    Call your doctor now or seek immediate medical care if:    · You start breathing fast and have not produced urine in the last 8 hours.     · You cannot keep fluids down.    Watch closely for changes in your health, and be sure to contact your doctor if:    · You do not get better as expected. Where can you learn more? Go to http://artemioApceramely.info/. Enter [id number] in the search box to learn more about \"Gastritis: Care Instructions. \"  Current as of: November 7, 2018  Content Version: 12.2  © 8330-8805 [employer]. Care instructions adapted under license by [employer] (which disclaims liability or warranty for this information). If you have questions about a medical condition or this instruction, always ask your healthcare professional. [place] [address] any warranty or liability for your use of this information. Patient Education        Managing Morning Sickness: Care Instructions  Your Care Instructions    For many women, the toughest part of early pregnancy is morning sickness. Morning sickness can range from mild nausea to severe nausea with bouts of vomiting. Symptoms may be worse in the morning, although they can strike at any time of the day or night. If you have nausea, vomiting, or both, look for safe measures that can bring you relief. You can take simple steps at home to manage morning sickness. These steps include changing what and when you eat and avoiding certain foods and smells. Some women find that acupuncture and acupressure wristbands also help. Follow-up care is a key part of your treatment and safety. Be sure to make and go to all appointments, and call your doctor if you are having problems. It's also a good idea to know your test results and keep a list of the medicines you take. How can you care for yourself at home? · Keep food in your stomach, but not too much at once. Your nausea may be worse if your stomach is empty. Eat five or six small meals a day instead of three large meals. · For morning nausea, eat a small snack, such as a couple of crackers or dry biscuits, before rising. Allow a few minutes for your stomach to settle before you get out of bed slowly. · Drink plenty of fluids, enough so that your urine is light yellow or clear like water. If you have kidney, heart, or liver disease and have to limit fluids, talk with your doctor before you increase the amount of fluids you drink. Some women find that peppermint tea helps with nausea. · Eat more protein, such as chicken, fish, lean meat, beans, nuts, and seeds. · Eat carbohydrate foods, such as potatoes, whole-grain cereals, rice, and pasta. · Avoid smells and foods that make you feel nauseated. Spicy or high-fat foods, citrus juice, milk, coffee, and tea with caffeine often make nausea worse. · Do not drink alcohol. · Do not smoke. Try not to be around others who smoke. If you need help quitting, talk to your doctor about stop-smoking programs and medicines. These can increase your chances of quitting for good. · If you are taking iron supplements, ask your doctor if they are necessary. Iron can make nausea worse. · Get lots of rest. Stress and fatigue can make your morning sickness worse. · Ask your doctor about taking prescription medicine, or over-the-counter products such as vitamin B6, doxylamine, or ca, to relieve your symptoms. Your doctor can tell you the doses that are safe for you. · Take your prenatal vitamins at night on a full stomach. When should you call for help? Call 911 anytime you think you may need emergency care.  For example, call if:    · You passed out (lost consciousness).    Call your doctor now or seek immediate medical care if:    · You are sick to your stomach or cannot drink fluids.     · You have symptoms of dehydration, such as:  ? Dry eyes and a dry mouth. ? Passing only a little urine. ? Feeling thirstier than usual.     · You are not able to keep down your medicine.     · You have pain in your belly or pelvis.    Watch closely for changes in your health, and be sure to contact your doctor if:    · You do not get better as expected. Where can you learn more? Go to http://artemio-mely.info/. Enter J803 in the search box to learn more about \"Managing Morning Sickness: Care Instructions. \"  Current as of: May 29, 2019  Content Version: 12.2  © 7366-1364 [employer]. Care instructions adapted under license by [employer] (which disclaims liability or warranty for this information). If you have questions about a medical condition or this instruction, always ask your healthcare professional. [place] [address] any warranty or liability for your use of this information.

## 2019-11-13 NOTE — ED NOTES
Patient turned over to me Dr Camron Pham. Is very pleasant 26-year-old female G 10 P4 who presents with some vomiting. Patient initially had some regular vomiting that became tinged with blood. H&H is unremarkable. Likely okay for outpatient follow-up. She was incidentally noted to have positive pregnancy test denies any vaginal bleeding vaginal cramping or abdominal pain. Pending official read. She states she is feeling much better she is no longer feeling anxious she has no chest pain or shortness of breath heart rates normal do not think this is thyroid. Likely okay for outpatient follow-up with her OB at [address] [place]  by crisis and cleared patient feeling better going to follow-up with GI, and OB.
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
## 2019-11-13 NOTE — ED NOTES
Patient said she would love to be admitted to take a mental break. Patient says if she does not get help she will start hallucinating because she feels like her anxiety is getting worse. Patient says she is paranoid about her heart. Patient stated she would like to talk to a crisis worker.

## 2019-11-13 NOTE — ED PROVIDER NOTES
Keagan Sanchez is a 26 y.o. Female with a past medical history of anxiety coming in with vomiting. Patient states that she started vomiting last Wednesday, 6 days ago. She states it was more intermittent at first however the last 24 hours has been more persistent. She states that the vomit has changed from yellow to green to brown. She reports some mild epigastric discomfort due to the persistent vomiting, but otherwise denies abdominal pain. She states that she has not had any diarrhea, but had a bowel movement yesterday which was dark black. Denies any bowel movement today. Patient states that she does drink alcohol occasionally and had roughly 4 beers last Wednesday prior to the onset of the symptoms. Patient denies any previous abdominal surgeries. Patient denies any dysuria or hematuria. She denies any vaginal discharge or lower pelvic pain. Patient denies any fevers or chills. Patient has no other complaints at this time.            Past Medical History:   Diagnosis Date    Neurological disorder        Past Surgical History:   Procedure Laterality Date    HX GYN      HX LEFT SALPINGO-OOPHORECTOMY  2014         Family History:   Problem Relation Age of Onset    Heart Disease Maternal Grandmother         had a triple bypass 6/2013    Alcohol abuse Maternal Grandfather     Bleeding Prob Other         Breast Cancer       Social History     Socioeconomic History    Marital status:      Spouse name: Not on file    Number of children: Not on file    Years of education: Not on file    Highest education level: Not on file   Occupational History    Not on file   Social Needs    Financial resource strain: Not on file    Food insecurity:     Worry: Not on file     Inability: Not on file    Transportation needs:     Medical: Not on file     Non-medical: Not on file   Tobacco Use    Smoking status: Current Some Day Smoker     Packs/day: 0.25     Types: Cigars    Smokeless tobacco: Never Used  Tobacco comment: pt smokes 3/day (black and mild cigars)   Substance and Sexual Activity    Alcohol use: Yes     Comment: socially    Drug use: Yes     Frequency: 2.0 times per week     Types: Marijuana    Sexual activity: Yes     Partners: Male     Birth control/protection: None   Lifestyle    Physical activity:     Days per week: Not on file     Minutes per session: Not on file    Stress: Not on file   Relationships    Social connections:     Talks on phone: Not on file     Gets together: Not on file     Attends Confucianism service: Not on file     Active member of club or organization: Not on file     Attends meetings of clubs or organizations: Not on file     Relationship status: Not on file    Intimate partner violence:     Fear of current or ex partner: Not on file     Emotionally abused: Not on file     Physically abused: Not on file     Forced sexual activity: Not on file   Other Topics Concern    Not on file   Social History Narrative    Not on file         ALLERGIES: Patient has no known allergies. Review of Systems   Constitutional: Negative. Negative for chills and fever. HENT: Negative. Eyes: Negative. Respiratory: Negative. Negative for shortness of breath. Cardiovascular: Negative. Negative for chest pain. Gastrointestinal: Positive for abdominal pain, nausea and vomiting. Negative for diarrhea. Genitourinary: Negative. Negative for dysuria, hematuria, pelvic pain and vaginal discharge. Musculoskeletal: Negative. Negative for back pain and myalgias. Skin: Negative. Negative for rash. Neurological: Negative. Negative for dizziness, weakness and light-headedness. Psychiatric/Behavioral: The patient is nervous/anxious. All other systems reviewed and are negative.       Vitals:    11/13/19 0302 11/13/19 0328   BP: 114/68    Pulse: 80    Resp: 24    Temp: 97.2 °F (36.2 °C)    SpO2: 100% 100%   Weight: 70.8 kg (156 lb)    Height: 5' 6\" (1.676 m) Physical Exam   Constitutional: She is oriented to person, place, and time. She appears well-developed and well-nourished. Appears anxious   HENT:   Head: Normocephalic and atraumatic. Dry mucous membranes   Eyes: Pupils are equal, round, and reactive to light. Conjunctivae and EOM are normal. No scleral icterus. Neck: Trachea normal and normal range of motion. Neck supple. No JVD present. Cardiovascular: Normal rate, regular rhythm, S1 normal and S2 normal. Exam reveals no gallop and no friction rub. No murmur heard. Pulmonary/Chest: Effort normal and breath sounds normal. No accessory muscle usage. No respiratory distress. Abdominal: Soft. Normal appearance. She exhibits no distension. There is no rigidity and no guarding. Minimal epigastric tenderness to deep palpation. No peritoneal signs. Genitourinary:   Genitourinary Comments: Rectal exam shows brown stool in the rectal vault. Musculoskeletal: Normal range of motion. She exhibits no edema or tenderness. Neurological: She is alert and oriented to person, place, and time. She has normal strength. Coordination normal.   Skin: Skin is warm and intact. No rash noted. Psychiatric: She has a normal mood and affect. Her speech is normal and behavior is normal.   Vitals reviewed. MDM  Number of Diagnoses or Management Options  Anxiety:   Gastritis and duodenitis:   IUP (intrauterine pregnancy), incidental:   Nausea and vomiting during pregnancy prior to 22 weeks gestation:   Diagnosis management comments: Claus Bills is a 26 y.o. Female coming in with vomiting, dark stools, and epigastric abdominal pain. Patient is well-appearing and has only minimal abdominal patient. Differential diagnosis includes GERD, gastritis, peptic ulcer disease, or upper GI bleed. Will treat with PPI, Zofran, fluids and will check basic labs, lipase, blood counts, and CMP. Gastric occult blood is positive. Fecal occult blood is negative. Hemoglobin stable. Normal white blood cell count. Likely mild to moderate gastritis due to persistent nausea and vomiting possibly due to alcohol or pregnancy. Will get ultrasound to rule out ectopic pregnancy as patient has had ectopic in the past.  No significant upper GI bleed given hemoglobin of 15. Patient is dry and will provide IV fluids. Also note starvation ketosis. Normal anion gap. Will provide some IV fluids with glucose to help with ketosis as well as anti-emetics. Will need outpatient follow-up with OB/GYN. Procedures      Vitals:  Patient Vitals for the past 12 hrs:   Temp Pulse Resp BP SpO2   11/13/19 0328     100 %   11/13/19 0302 97.2 °F (36.2 °C) 80 24 114/68 100 %       Medications ordered:   Medications   ondansetron (ZOFRAN) injection 4 mg (4 mg IntraVENous Given 11/13/19 0322)   famotidine (PF) (PEPCID) injection 20 mg (20 mg IntraVENous Given 11/13/19 0323)   sodium chloride 0.9 % bolus infusion 1,000 mL (0 mL IntraVENous IV Completed 11/13/19 0509)   dextrose 5% and 0.9% NaCl infusion (250 mL/hr IntraVENous New Bag 11/13/19 0568)         Lab findings:  Recent Results (from the past 12 hour(s))   CBC WITH AUTOMATED DIFF    Collection Time: 11/13/19  3:10 AM   Result Value Ref Range    WBC 9.8 4.6 - 13.2 K/uL    RBC 5.01 4.20 - 5.30 M/uL    HGB 15.5 12.0 - 16.0 g/dL    HCT 43.0 35.0 - 45.0 %    MCV 85.8 74.0 - 97.0 FL    MCH 30.9 24.0 - 34.0 PG    MCHC 36.0 31.0 - 37.0 g/dL    RDW 11.9 11.6 - 14.5 %    PLATELET 592 783 - 753 K/uL    MPV 10.0 9.2 - 11.8 FL    NEUTROPHILS 83 (H) 40 - 73 %    LYMPHOCYTES 14 (L) 21 - 52 %    MONOCYTES 3 3 - 10 %    EOSINOPHILS 0 0 - 5 %    BASOPHILS 0 0 - 2 %    ABS. NEUTROPHILS 8.1 (H) 1.8 - 8.0 K/UL    ABS. LYMPHOCYTES 1.4 0.9 - 3.6 K/UL    ABS. MONOCYTES 0.3 0.05 - 1.2 K/UL    ABS. EOSINOPHILS 0.0 0.0 - 0.4 K/UL    ABS.  BASOPHILS 0.0 0.0 - 0.1 K/UL    DF AUTOMATED     METABOLIC PANEL, COMPREHENSIVE    Collection Time: 11/13/19  3:10 AM Result Value Ref Range    Sodium 137 136 - 145 mmol/L    Potassium 3.7 3.5 - 5.5 mmol/L    Chloride 105 100 - 111 mmol/L    CO2 22 21 - 32 mmol/L    Anion gap 10 3.0 - 18 mmol/L    Glucose 97 74 - 99 mg/dL    BUN 8 7.0 - 18 MG/DL    Creatinine 0.91 0.6 - 1.3 MG/DL    BUN/Creatinine ratio 9 (L) 12 - 20      GFR est AA >60 >60 ml/min/1.73m2    GFR est non-AA >60 >60 ml/min/1.73m2    Calcium 9.6 8.5 - 10.1 MG/DL    Bilirubin, total 1.8 (H) 0.2 - 1.0 MG/DL    ALT (SGPT) 27 13 - 56 U/L    AST (SGOT) 18 10 - 38 U/L    Alk.  phosphatase 47 45 - 117 U/L    Protein, total 7.7 6.4 - 8.2 g/dL    Albumin 4.4 3.4 - 5.0 g/dL    Globulin 3.3 2.0 - 4.0 g/dL    A-G Ratio 1.3 0.8 - 1.7     LIPASE    Collection Time: 11/13/19  3:10 AM   Result Value Ref Range    Lipase 116 73 - 393 U/L   MAGNESIUM    Collection Time: 11/13/19  3:10 AM   Result Value Ref Range    Magnesium 1.8 1.6 - 2.6 mg/dL   BETA HCG, QT    Collection Time: 11/13/19  3:10 AM   Result Value Ref Range    Beta HCG, QT 55,275 (H) 0 - 10 MIU/ML   ETHYL ALCOHOL    Collection Time: 11/13/19  3:10 AM   Result Value Ref Range    ALCOHOL(ETHYL),SERUM <3 0 - 3 MG/DL   URINALYSIS W/ RFLX MICROSCOPIC    Collection Time: 11/13/19  3:15 AM   Result Value Ref Range    Color DARK YELLOW      Appearance CLEAR      Specific gravity 1.028 1.005 - 1.030      pH (UA) 6.0 5.0 - 8.0      Protein 30 (A) NEG mg/dL    Glucose NEGATIVE  NEG mg/dL    Ketone >160 (A) NEG mg/dL    Bilirubin SMALL (A) NEG      Blood NEGATIVE  NEG      Urobilinogen 1.0 0.2 - 1.0 EU/dL    Nitrites NEGATIVE  NEG      Leukocyte Esterase TRACE (A) NEG     HCG URINE, QL    Collection Time: 11/13/19  3:15 AM   Result Value Ref Range    HCG urine, QL POSITIVE (A) NEG     OCCULT BLOOD, GASTRIC    Collection Time: 11/13/19  3:15 AM   Result Value Ref Range    OCCULT BLOOD,GASTRIC POSITIVE (A) NEG      pH,GASTRIC 3 1.5 - 3.5     URINE MICROSCOPIC ONLY    Collection Time: 11/13/19  3:15 AM   Result Value Ref Range    WBC 0 to 2 0 - 4 /hpf    RBC 0 to 2 0 - 5 /hpf    Epithelial cells FEW 0 - 5 /lpf    Mucus 4+ (A) NEG /lpf   DRUG SCREEN, URINE    Collection Time: 11/13/19  3:15 AM   Result Value Ref Range    BENZODIAZEPINES NEGATIVE  NEG      BARBITURATES NEGATIVE  NEG      THC (TH-CANNABINOL) POSITIVE (A) NEG      OPIATES NEGATIVE  NEG      PCP(PHENCYCLIDINE) NEGATIVE  NEG      COCAINE NEGATIVE  NEG      AMPHETAMINES NEGATIVE  NEG      METHADONE NEGATIVE  NEG      HDSCOM (NOTE)    OCCULT BLOOD, STOOL    Collection Time: 11/13/19  3:15 AM   Result Value Ref Range    Occult blood, stool NEGATIVE  NEG         X-Ray, CT or other radiology findings or impressions:  US OB TV W DOPPLER    (Results Pending)       Reevaluation of patient:   Consult: Spoke to Krystal from crisis, agrees to come and evaluate the patient once medically cleared. Patient care transferred to incoming physician pending crisis evaluation and disposition. Disposition:  Diagnosis:   1. IUP (intrauterine pregnancy), incidental    2. Nausea and vomiting during pregnancy prior to 22 weeks gestation    3. Gastritis and duodenitis    4. Anxiety        Disposition: Pending    Follow-up Information    None          Patient's Medications   Start Taking    DOXYLAMINE-PYRIDOXINE, VIT B6, (DICLEGIS) 10-10 MG TBEC DR TABLET    Take 1 Tab by mouth nightly for 14 days. PRENATAL MULTIVIT-CA-MIN-FE-FA (PRENATAL VITAMIN) TAB    Take 1 Tab by mouth daily. Continue Taking    CALCIUM CARBONATE (TUMS E-X) 300 MG (750 MG) CHEWABLE TABLET    Take 1 Tab by mouth three (3) times daily as needed for Indigestion. CHLORZOXAZONE (PARAFON FORTE DSC) 500 MG TABLET    Take 1 Tab by mouth four (4) times daily as needed for Muscle Spasm(s). FAMOTIDINE (PEPCID AC) 20 MG TABLET    Take 1 Tab by mouth two (2) times a day. ONDANSETRON (ZOFRAN ODT) 4 MG DISINTEGRATING TABLET    Take 1 Tab by mouth every eight (8) hours as needed for Nausea (or vomiting.).  Allow to dissolve on tongue These Medications have changed    No medications on file   Stop Taking    No medications on file

## 2024-01-27 ENCOUNTER — HOSPITAL ENCOUNTER (EMERGENCY)
Facility: HOSPITAL | Age: 34
Discharge: HOME | End: 2024-01-27
Attending: EMERGENCY MEDICINE
Payer: MEDICAID

## 2024-01-27 VITALS
RESPIRATION RATE: 22 BRPM | SYSTOLIC BLOOD PRESSURE: 117 MMHG | BODY MASS INDEX: 27.32 KG/M2 | HEIGHT: 66 IN | WEIGHT: 170 LBS | HEART RATE: 90 BPM | DIASTOLIC BLOOD PRESSURE: 69 MMHG | OXYGEN SATURATION: 100 %

## 2024-01-27 DIAGNOSIS — R11.2 NAUSEA AND VOMITING, UNSPECIFIED VOMITING TYPE: Primary | ICD-10-CM

## 2024-01-27 LAB
ALBUMIN SERPL-MCNC: 3.9 G/DL (ref 3.4–5)
ALBUMIN/GLOB SERPL: 1.2 (ref 0.8–1.7)
ALP SERPL-CCNC: 51 U/L (ref 45–117)
ALT SERPL-CCNC: 34 U/L (ref 13–56)
ANION GAP SERPL CALC-SCNC: 7 MMOL/L (ref 3–18)
APPEARANCE UR: CLEAR
AST SERPL-CCNC: 21 U/L (ref 10–38)
BACTERIA URNS QL MICRO: ABNORMAL /HPF
BASOPHILS # BLD: 0 K/UL (ref 0–0.1)
BASOPHILS NFR BLD: 1 % (ref 0–2)
BILIRUB SERPL-MCNC: 0.6 MG/DL (ref 0.2–1)
BILIRUB UR QL: NEGATIVE
BUN SERPL-MCNC: 9 MG/DL (ref 7–18)
BUN/CREAT SERPL: 8 (ref 12–20)
CALCIUM SERPL-MCNC: 9.4 MG/DL (ref 8.5–10.1)
CHLORIDE SERPL-SCNC: 114 MMOL/L (ref 100–111)
CO2 SERPL-SCNC: 21 MMOL/L (ref 21–32)
COLOR UR: YELLOW
CREAT SERPL-MCNC: 1.06 MG/DL (ref 0.6–1.3)
DIFFERENTIAL METHOD BLD: NORMAL
EOSINOPHIL # BLD: 0.1 K/UL (ref 0–0.4)
EOSINOPHIL NFR BLD: 1 % (ref 0–5)
EPITH CASTS URNS QL MICRO: ABNORMAL /LPF (ref 0–5)
ERYTHROCYTE [DISTWIDTH] IN BLOOD BY AUTOMATED COUNT: 12.3 % (ref 11.6–14.5)
ETHANOL SERPL-MCNC: 20 MG/DL (ref 0–3)
GLOBULIN SER CALC-MCNC: 3.2 G/DL (ref 2–4)
GLUCOSE SERPL-MCNC: 94 MG/DL (ref 74–99)
GLUCOSE UR STRIP.AUTO-MCNC: NEGATIVE MG/DL
HCG UR QL: NEGATIVE
HCT VFR BLD AUTO: 39.9 % (ref 35–45)
HGB BLD-MCNC: 13.8 G/DL (ref 12–16)
HGB UR QL STRIP: NEGATIVE
IMM GRANULOCYTES # BLD AUTO: 0 K/UL (ref 0–0.04)
IMM GRANULOCYTES NFR BLD AUTO: 0 % (ref 0–0.5)
KETONES UR QL STRIP.AUTO: 15 MG/DL
LEUKOCYTE ESTERASE UR QL STRIP.AUTO: NEGATIVE
LIPASE SERPL-CCNC: 57 U/L (ref 13–75)
LYMPHOCYTES # BLD: 3.1 K/UL (ref 0.9–3.6)
LYMPHOCYTES NFR BLD: 44 % (ref 21–52)
MCH RBC QN AUTO: 30.1 PG (ref 24–34)
MCHC RBC AUTO-ENTMCNC: 34.6 G/DL (ref 31–37)
MCV RBC AUTO: 86.9 FL (ref 78–100)
MONOCYTES # BLD: 0.4 K/UL (ref 0.05–1.2)
MONOCYTES NFR BLD: 6 % (ref 3–10)
NEUTS SEG # BLD: 3.4 K/UL (ref 1.8–8)
NEUTS SEG NFR BLD: 49 % (ref 40–73)
NITRITE UR QL STRIP.AUTO: NEGATIVE
NRBC # BLD: 0 K/UL (ref 0–0.01)
NRBC BLD-RTO: 0 PER 100 WBC
PH UR STRIP: 8.5 (ref 5–8)
PLATELET # BLD AUTO: 298 K/UL (ref 135–420)
PMV BLD AUTO: 9.8 FL (ref 9.2–11.8)
POTASSIUM SERPL-SCNC: 3.6 MMOL/L (ref 3.5–5.5)
PROT SERPL-MCNC: 7.1 G/DL (ref 6.4–8.2)
PROT UR STRIP-MCNC: ABNORMAL MG/DL
RBC # BLD AUTO: 4.59 M/UL (ref 4.2–5.3)
RBC #/AREA URNS HPF: ABNORMAL /HPF (ref 0–5)
SODIUM SERPL-SCNC: 142 MMOL/L (ref 136–145)
SP GR UR REFRACTOMETRY: 1.02 (ref 1–1.03)
UROBILINOGEN UR QL STRIP.AUTO: 1 EU/DL (ref 0.2–1)
WBC # BLD AUTO: 6.9 K/UL (ref 4.6–13.2)
WBC URNS QL MICRO: NEGATIVE /HPF (ref 0–5)

## 2024-01-27 PROCEDURE — 82077 ASSAY SPEC XCP UR&BREATH IA: CPT

## 2024-01-27 PROCEDURE — 96374 THER/PROPH/DIAG INJ IV PUSH: CPT

## 2024-01-27 PROCEDURE — 81001 URINALYSIS AUTO W/SCOPE: CPT

## 2024-01-27 PROCEDURE — 6370000000 HC RX 637 (ALT 250 FOR IP): Performed by: EMERGENCY MEDICINE

## 2024-01-27 PROCEDURE — 6360000002 HC RX W HCPCS: Performed by: EMERGENCY MEDICINE

## 2024-01-27 PROCEDURE — 80053 COMPREHEN METABOLIC PANEL: CPT

## 2024-01-27 PROCEDURE — 99284 EMERGENCY DEPT VISIT MOD MDM: CPT

## 2024-01-27 PROCEDURE — 96375 TX/PRO/DX INJ NEW DRUG ADDON: CPT

## 2024-01-27 PROCEDURE — 85025 COMPLETE CBC W/AUTO DIFF WBC: CPT

## 2024-01-27 PROCEDURE — 83690 ASSAY OF LIPASE: CPT

## 2024-01-27 PROCEDURE — 81025 URINE PREGNANCY TEST: CPT

## 2024-01-27 RX ORDER — ONDANSETRON 4 MG/1
4 TABLET, FILM COATED ORAL 3 TIMES DAILY PRN
Qty: 15 TABLET | Refills: 0 | Status: SHIPPED | OUTPATIENT
Start: 2024-01-27

## 2024-01-27 RX ORDER — MIDAZOLAM HYDROCHLORIDE 2 MG/2ML
2 INJECTION, SOLUTION INTRAMUSCULAR; INTRAVENOUS
Status: COMPLETED | OUTPATIENT
Start: 2024-01-27 | End: 2024-01-27

## 2024-01-27 RX ORDER — MORPHINE SULFATE 4 MG/ML
4 INJECTION, SOLUTION INTRAMUSCULAR; INTRAVENOUS
Status: DISCONTINUED | OUTPATIENT
Start: 2024-01-27 | End: 2024-01-27 | Stop reason: HOSPADM

## 2024-01-27 RX ORDER — ONDANSETRON 2 MG/ML
4 INJECTION INTRAMUSCULAR; INTRAVENOUS
Status: COMPLETED | OUTPATIENT
Start: 2024-01-27 | End: 2024-01-27

## 2024-01-27 RX ADMIN — MIDAZOLAM HYDROCHLORIDE 2 MG: 1 INJECTION, SOLUTION INTRAMUSCULAR; INTRAVENOUS at 04:59

## 2024-01-27 RX ADMIN — ONDANSETRON 4 MG: 2 INJECTION INTRAMUSCULAR; INTRAVENOUS at 04:58

## 2024-01-27 RX ADMIN — ALUMINUM HYDROXIDE, MAGNESIUM HYDROXIDE, AND SIMETHICONE 40 ML: 200; 200; 20 SUSPENSION ORAL at 05:34

## 2024-01-27 ASSESSMENT — ENCOUNTER SYMPTOMS
VOMITING: 1
NAUSEA: 1
ABDOMINAL PAIN: 1

## 2024-01-27 NOTE — ED NOTES
Pt left before pt could be provided discharge paperwork. Pt's significant other left with pt and states they \"do not need them.\"

## 2024-01-27 NOTE — ED PROVIDER NOTES
[facility] EMERGENCY DEPT  EMERGENCY DEPARTMENT ENCOUNTER      Pt Name: Becky Peraza  MRN: [id number]  Birthdate 1990  Date of evaluation: 1/27/2024  Provider: SAMMY YOON MD  6:29 AM    CHIEF COMPLAINT       Chief Complaint   Patient presents with    Emesis    Anxiety         HISTORY OF PRESENT ILLNESS    Becky Peraza is a 33 y.o. female who presents to the emergency department     33-year-old female past medical history anxiety per patient presents with feeling anxious and vague abdominal pain discomfort which started after she drank some alcohol tonight.  Patient has no other complaints.  She also had diarrhea x 1 time.  No vaginal complaints denies pregnancy recent.    The history is provided by the patient and medical records. No  was used.       Nursing Notes were reviewed.    REVIEW OF SYSTEMS       Review of Systems   Gastrointestinal:  Positive for abdominal pain, nausea and vomiting.       Except as noted above the remainder of the review of systems was reviewed and negative.       PAST MEDICAL HISTORY     Past Medical History:   Diagnosis Date    Neurological disorder          SURGICAL HISTORY       Past Surgical History:   Procedure Laterality Date    GYN      SALPINGO-OOPHORECTOMY  2014         CURRENT MEDICATIONS       Previous Medications    No medications on file       ALLERGIES     Patient has no known allergies.    FAMILY HISTORY       Family History   Problem Relation Age of Onset    Heart Disease Maternal Grandmother         had a triple bypass 6/2013    Alcohol Abuse Maternal Grandfather     Bleeding Prob Other         Breast Cancer          SOCIAL HISTORY       Social History     Socioeconomic History    Marital status:    Tobacco Use    Smoking status: Some Days     Current packs/day: 0.25     Types: Cigarettes    Smokeless tobacco: Never    Tobacco comments:     Quit smoking: pt smokes 3/day (black and mild cigars)   Substance and Sexual Activity    Alcohol use:  Prescriptions    ONDANSETRON (ZOFRAN) 4 MG TABLET    Take 1 tablet by mouth 3 times daily as needed for Nausea or Vomiting     Controlled Substances Monitoring:          No data to display                (Please note that portions of this note were completed with a voice recognition program.  Efforts were made to edit the dictations but occasionally words are mis-transcribed.)    SAMYM YOON MD (electronically signed)  Attending Emergency Physician           Sammy Yoon MD  01/27/24 0648

## 2025-02-15 ENCOUNTER — HOSPITAL ENCOUNTER (EMERGENCY)
Facility: HOSPITAL | Age: 35
Discharge: HOME | End: 2025-02-15
Attending: STUDENT IN AN ORGANIZED HEALTH CARE EDUCATION/TRAINING PROGRAM
Payer: MEDICAID

## 2025-02-15 ENCOUNTER — APPOINTMENT (OUTPATIENT)
Facility: HOSPITAL | Age: 35
End: 2025-02-15
Payer: MEDICAID

## 2025-02-15 VITALS
HEART RATE: 94 BPM | OXYGEN SATURATION: 100 % | BODY MASS INDEX: 27.64 KG/M2 | DIASTOLIC BLOOD PRESSURE: 81 MMHG | RESPIRATION RATE: 16 BRPM | HEIGHT: 66 IN | SYSTOLIC BLOOD PRESSURE: 128 MMHG | WEIGHT: 172 LBS | TEMPERATURE: 98.2 F

## 2025-02-15 DIAGNOSIS — R00.0 SINUS TACHYCARDIA SEEN ON CARDIAC MONITOR: Primary | ICD-10-CM

## 2025-02-15 LAB
ALBUMIN SERPL-MCNC: 4 G/DL (ref 3.4–5)
ALBUMIN/GLOB SERPL: 1.5 (ref 0.8–1.7)
ALP SERPL-CCNC: 59 U/L (ref 45–117)
ALT SERPL-CCNC: 26 U/L (ref 13–56)
ANION GAP SERPL CALC-SCNC: 7 MMOL/L (ref 3–18)
APPEARANCE UR: ABNORMAL
AST SERPL-CCNC: 12 U/L (ref 10–38)
BACTERIA URNS QL MICRO: NORMAL /HPF
BASOPHILS # BLD: 0.04 K/UL (ref 0–0.1)
BASOPHILS NFR BLD: 0.3 % (ref 0–2)
BILIRUB SERPL-MCNC: 1.8 MG/DL (ref 0.2–1)
BILIRUB UR QL: NEGATIVE
BUN SERPL-MCNC: 6 MG/DL (ref 7–18)
BUN/CREAT SERPL: 6 (ref 12–20)
CALCIUM SERPL-MCNC: 9.3 MG/DL (ref 8.5–10.1)
CHLORIDE SERPL-SCNC: 112 MMOL/L (ref 100–111)
CO2 SERPL-SCNC: 23 MMOL/L (ref 21–32)
COLOR UR: YELLOW
CREAT SERPL-MCNC: 1 MG/DL (ref 0.6–1.3)
DIFFERENTIAL METHOD BLD: ABNORMAL
EOSINOPHIL # BLD: 0.02 K/UL (ref 0–0.4)
EOSINOPHIL NFR BLD: 0.2 % (ref 0–5)
EPITH CASTS URNS QL MICRO: NORMAL /LPF (ref 0–5)
ERYTHROCYTE [DISTWIDTH] IN BLOOD BY AUTOMATED COUNT: 12.3 % (ref 11.6–14.5)
GLOBULIN SER CALC-MCNC: 2.6 G/DL (ref 2–4)
GLUCOSE SERPL-MCNC: 116 MG/DL (ref 74–99)
GLUCOSE UR STRIP.AUTO-MCNC: NEGATIVE MG/DL
HCG UR QL: NEGATIVE
HCT VFR BLD AUTO: 38.5 % (ref 35–45)
HGB BLD-MCNC: 13.3 G/DL (ref 12–16)
HGB UR QL STRIP: ABNORMAL
IMM GRANULOCYTES # BLD AUTO: 0.03 K/UL (ref 0–0.04)
IMM GRANULOCYTES NFR BLD AUTO: 0.3 % (ref 0–0.5)
KETONES UR QL STRIP.AUTO: NEGATIVE MG/DL
LEUKOCYTE ESTERASE UR QL STRIP.AUTO: ABNORMAL
LYMPHOCYTES # BLD: 2.78 K/UL (ref 0.9–3.3)
LYMPHOCYTES NFR BLD: 23.8 % (ref 21–52)
MCH RBC QN AUTO: 30.4 PG (ref 24–34)
MCHC RBC AUTO-ENTMCNC: 34.5 G/DL (ref 31–37)
MCV RBC AUTO: 87.9 FL (ref 78–100)
MONOCYTES # BLD: 0.59 K/UL (ref 0.05–1.2)
MONOCYTES NFR BLD: 5.1 % (ref 3–10)
NEUTS SEG # BLD: 8.2 K/UL (ref 1.8–8)
NEUTS SEG NFR BLD: 70.3 % (ref 40–73)
NITRITE UR QL STRIP.AUTO: NEGATIVE
NRBC # BLD: 0 K/UL (ref 0–0.01)
NRBC BLD-RTO: 0 PER 100 WBC
PH UR STRIP: 6.5 (ref 5–8)
PLATELET # BLD AUTO: 351 K/UL (ref 135–420)
PMV BLD AUTO: 9.6 FL (ref 9.2–11.8)
POTASSIUM SERPL-SCNC: 3.6 MMOL/L (ref 3.5–5.5)
PROT SERPL-MCNC: 6.6 G/DL (ref 6.4–8.2)
PROT UR STRIP-MCNC: NEGATIVE MG/DL
RBC # BLD AUTO: 4.38 M/UL (ref 4.2–5.3)
RBC #/AREA URNS HPF: NORMAL /HPF (ref 0–5)
SODIUM SERPL-SCNC: 142 MMOL/L (ref 136–145)
SP GR UR REFRACTOMETRY: 1.01 (ref 1–1.03)
TROPONIN I SERPL HS-MCNC: 5 NG/L (ref 0–54)
UROBILINOGEN UR QL STRIP.AUTO: 1 EU/DL (ref 0.2–1)
WBC # BLD AUTO: 11.7 K/UL (ref 4.6–13.2)
WBC URNS QL MICRO: NORMAL /HPF (ref 0–5)

## 2025-02-15 PROCEDURE — 81025 URINE PREGNANCY TEST: CPT

## 2025-02-15 PROCEDURE — 85025 COMPLETE CBC W/AUTO DIFF WBC: CPT

## 2025-02-15 PROCEDURE — 6370000000 HC RX 637 (ALT 250 FOR IP): Performed by: STUDENT IN AN ORGANIZED HEALTH CARE EDUCATION/TRAINING PROGRAM

## 2025-02-15 PROCEDURE — 81001 URINALYSIS AUTO W/SCOPE: CPT

## 2025-02-15 PROCEDURE — 80053 COMPREHEN METABOLIC PANEL: CPT

## 2025-02-15 PROCEDURE — 84484 ASSAY OF TROPONIN QUANT: CPT

## 2025-02-15 PROCEDURE — 99285 EMERGENCY DEPT VISIT HI MDM: CPT

## 2025-02-15 PROCEDURE — 71046 X-RAY EXAM CHEST 2 VIEWS: CPT

## 2025-02-15 PROCEDURE — 93005 ELECTROCARDIOGRAM TRACING: CPT | Performed by: STUDENT IN AN ORGANIZED HEALTH CARE EDUCATION/TRAINING PROGRAM

## 2025-02-15 RX ORDER — PROPRANOLOL HCL 20 MG
20 TABLET ORAL 3 TIMES DAILY PRN
Qty: 90 TABLET | Refills: 0 | Status: SHIPPED | OUTPATIENT
Start: 2025-02-15 | End: 2025-02-15

## 2025-02-15 RX ORDER — PROPRANOLOL HYDROCHLORIDE 10 MG/1
20 TABLET ORAL ONCE
Status: COMPLETED | OUTPATIENT
Start: 2025-02-15 | End: 2025-02-15

## 2025-02-15 RX ORDER — PROPRANOLOL HCL 20 MG
20 TABLET ORAL 3 TIMES DAILY PRN
Qty: 90 TABLET | Refills: 0 | Status: SHIPPED | OUTPATIENT
Start: 2025-02-15 | End: 2025-03-17

## 2025-02-15 RX ADMIN — PROPRANOLOL HYDROCHLORIDE 20 MG: 10 TABLET ORAL at 11:13

## 2025-02-15 ASSESSMENT — PAIN - FUNCTIONAL ASSESSMENT: PAIN_FUNCTIONAL_ASSESSMENT: NONE - DENIES PAIN

## 2025-02-15 NOTE — ED PROVIDER NOTES
[facility] EMERGENCY DEPARTMENT  EMERGENCY DEPARTMENT ENCOUNTER    Patient Name: Becky Peraza  MRN: [id number]  YOB: 1990  Provider: Sakina Stahl MD  PCP: [facility], P[facility]   Time/Date of evaluation: 10:03 AM EST on 2/15/25    History of Presenting Illness     Chief Complaint   Patient presents with    Palpitations       History Provided by: Patient   History is limited by: Nothing    HISTORY (Narative):   Becky Peraza is a 34 y.o. female with ADHD, anxiety/depression, bipolar disorder, and GERD who presents to the ED bed ER11/11 with ongoing palpitations after 3 recent ED visits for similar complaints. Yesterday she had UA, CMP, magnesium, TSH performed. She was diagnosed with a UTI on Keflex, mild hypokalemia (prescribed potassium supplement), and given Ativan prescription yesterday for likely anxiety. Was instructed to discontinue hydroxyzine yesterday as the anticholinergic side effects were thought to potentially be contributing to her high heart rate.  Reports that she took an Ativan this morning and her anxiety symptoms feel more or less controlled, but she still feels like her heart is beating rapidly from time to time, reporting that she counted her pulse rate to be at its highest 116, which was always at rest.  Has not had any associated chest pain, shortness of breath, nausea, vomiting, or numbness/weakness/tingling.  States that she has been under a lot of stress that she has 3 children and a 4-month-old.  Reports no recent leg pain or leg swelling.    Nursing Notes were all reviewed and agreed with or any disagreements were addressed in the HPI.    Past History     PAST MEDICAL HISTORY:  Past Medical History:   Diagnosis Date    Neurological disorder     UTI (urinary tract infection)        PAST SURGICAL HISTORY:  Past Surgical History:   Procedure Laterality Date    GYN      SALPINGO-OOPHORECTOMY  2014       FAMILY HISTORY:  Family History   Problem Relation Age of Onset

## 2025-02-16 LAB
EKG ATRIAL RATE: 93 BPM
EKG DIAGNOSIS: NORMAL
EKG P AXIS: 55 DEGREES
EKG P-R INTERVAL: 126 MS
EKG Q-T INTERVAL: 330 MS
EKG QRS DURATION: 78 MS
EKG QTC CALCULATION (BAZETT): 410 MS
EKG R AXIS: 26 DEGREES
EKG T AXIS: 28 DEGREES
EKG VENTRICULAR RATE: 93 BPM

## 2025-02-16 PROCEDURE — 93010 ELECTROCARDIOGRAM REPORT: CPT | Performed by: INTERNAL MEDICINE

## 2025-02-18 ENCOUNTER — APPOINTMENT (OUTPATIENT)
Facility: HOSPITAL | Age: 35
End: 2025-02-18
Payer: MEDICAID

## 2025-02-18 ENCOUNTER — HOSPITAL ENCOUNTER (EMERGENCY)
Facility: HOSPITAL | Age: 35
Discharge: LEFT BEFORE DISCHARGE | End: 2025-02-18
Attending: EMERGENCY MEDICINE
Payer: MEDICAID

## 2025-02-18 VITALS
BODY MASS INDEX: 26.84 KG/M2 | SYSTOLIC BLOOD PRESSURE: 130 MMHG | OXYGEN SATURATION: 100 % | TEMPERATURE: 97.5 F | RESPIRATION RATE: 16 BRPM | DIASTOLIC BLOOD PRESSURE: 79 MMHG | HEART RATE: 92 BPM | WEIGHT: 167 LBS | HEIGHT: 66 IN

## 2025-02-18 DIAGNOSIS — Z53.21 ELOPED FROM EMERGENCY DEPARTMENT: Primary | ICD-10-CM

## 2025-02-18 PROCEDURE — 99283 EMERGENCY DEPT VISIT LOW MDM: CPT

## 2025-02-18 PROCEDURE — 93005 ELECTROCARDIOGRAM TRACING: CPT | Performed by: EMERGENCY MEDICINE

## 2025-02-18 PROCEDURE — 4500000002 HC ER NO CHARGE

## 2025-02-18 ASSESSMENT — PAIN - FUNCTIONAL ASSESSMENT: PAIN_FUNCTIONAL_ASSESSMENT: 0-10

## 2025-02-18 ASSESSMENT — PAIN SCALES - GENERAL: PAINLEVEL_OUTOF10: 7

## 2025-02-18 NOTE — ED TRIAGE NOTES
Patient reports chest pain, burning sensation to center of chest, was seen in ED for same and she reports all of her bloodwork was normal per patient. Denies cough or fever.

## 2025-02-18 NOTE — ED NOTES
1330: Pt not in room.    1400: Pt not in room or restroom.    1430: Pt not in room, restroom, waiting room, or xray, will discharge from system.

## 2025-02-19 LAB
EKG ATRIAL RATE: 84 BPM
EKG DIAGNOSIS: NORMAL
EKG P AXIS: 55 DEGREES
EKG P-R INTERVAL: 124 MS
EKG Q-T INTERVAL: 322 MS
EKG QRS DURATION: 80 MS
EKG QTC CALCULATION (BAZETT): 380 MS
EKG R AXIS: 21 DEGREES
EKG T AXIS: 7 DEGREES
EKG VENTRICULAR RATE: 84 BPM

## 2025-02-19 PROCEDURE — 93010 ELECTROCARDIOGRAM REPORT: CPT | Performed by: INTERNAL MEDICINE

## 2025-02-20 NOTE — ED PROVIDER NOTES
Patient was roomed in the emergency department however when I went to evaluate patient she reportedly had eloped from the department.  We did check in the waiting room and attempted to locate her however she apparently has left the hospital without making anybody aware.    MD Jose G Hall Brandon B, MD  02/19/25 [id number]